# Patient Record
Sex: MALE | Race: WHITE | NOT HISPANIC OR LATINO | Employment: OTHER | URBAN - NONMETROPOLITAN AREA
[De-identification: names, ages, dates, MRNs, and addresses within clinical notes are randomized per-mention and may not be internally consistent; named-entity substitution may affect disease eponyms.]

---

## 2017-05-03 RX ORDER — CITALOPRAM 40 MG/1
TABLET ORAL
Qty: 30 TABLET | Refills: 0 | OUTPATIENT
Start: 2017-05-03

## 2017-05-15 RX ORDER — CITALOPRAM 40 MG/1
TABLET ORAL
Qty: 30 TABLET | Refills: 0 | OUTPATIENT
Start: 2017-05-15

## 2017-06-06 ENCOUNTER — OFFICE VISIT (OUTPATIENT)
Dept: FAMILY MEDICINE CLINIC | Facility: CLINIC | Age: 47
End: 2017-06-06

## 2017-06-06 ENCOUNTER — APPOINTMENT (OUTPATIENT)
Dept: LAB | Facility: HOSPITAL | Age: 47
End: 2017-06-06

## 2017-06-06 VITALS
HEIGHT: 68 IN | DIASTOLIC BLOOD PRESSURE: 68 MMHG | BODY MASS INDEX: 22.88 KG/M2 | SYSTOLIC BLOOD PRESSURE: 110 MMHG | WEIGHT: 151 LBS

## 2017-06-06 DIAGNOSIS — Z72.0 TOBACCO ABUSE: Chronic | ICD-10-CM

## 2017-06-06 DIAGNOSIS — I25.10 CAD IN NATIVE ARTERY: Primary | Chronic | ICD-10-CM

## 2017-06-06 DIAGNOSIS — G89.29 CHRONIC BILATERAL LOW BACK PAIN WITHOUT SCIATICA: ICD-10-CM

## 2017-06-06 DIAGNOSIS — I10 BENIGN HYPERTENSION: Chronic | ICD-10-CM

## 2017-06-06 DIAGNOSIS — Z23 NEED FOR VACCINATION: ICD-10-CM

## 2017-06-06 DIAGNOSIS — Z72.0 TOBACCO USE: ICD-10-CM

## 2017-06-06 DIAGNOSIS — M54.50 CHRONIC BILATERAL LOW BACK PAIN WITHOUT SCIATICA: ICD-10-CM

## 2017-06-06 LAB
ALBUMIN SERPL-MCNC: 4.2 G/DL (ref 3.4–4.8)
ALBUMIN/GLOB SERPL: 1.2 G/DL (ref 1.1–1.8)
ALP SERPL-CCNC: 73 U/L (ref 38–126)
ALT SERPL W P-5'-P-CCNC: 37 U/L (ref 21–72)
ANION GAP SERPL CALCULATED.3IONS-SCNC: 9 MMOL/L (ref 5–15)
ARTICHOKE IGE QN: 96 MG/DL (ref 1–129)
AST SERPL-CCNC: 72 U/L (ref 17–59)
BILIRUB SERPL-MCNC: 0.9 MG/DL (ref 0.2–1.3)
BUN BLD-MCNC: 15 MG/DL (ref 7–21)
BUN/CREAT SERPL: 16 (ref 7–25)
CALCIUM SPEC-SCNC: 9.4 MG/DL (ref 8.4–10.2)
CHLORIDE SERPL-SCNC: 101 MMOL/L (ref 95–110)
CO2 SERPL-SCNC: 29 MMOL/L (ref 22–31)
CREAT BLD-MCNC: 0.94 MG/DL (ref 0.7–1.3)
GFR SERPL CREATININE-BSD FRML MDRD: 85 ML/MIN/1.73 (ref 60–147)
GLOBULIN UR ELPH-MCNC: 3.6 GM/DL (ref 2.3–3.5)
GLUCOSE BLD-MCNC: 76 MG/DL (ref 60–100)
MAGNESIUM SERPL-MCNC: 1.9 MG/DL (ref 1.6–2.3)
POTASSIUM BLD-SCNC: 3.8 MMOL/L (ref 3.5–5.1)
PROT SERPL-MCNC: 7.8 G/DL (ref 6.3–8.6)
SODIUM BLD-SCNC: 139 MMOL/L (ref 137–145)

## 2017-06-06 PROCEDURE — 90471 IMMUNIZATION ADMIN: CPT | Performed by: FAMILY MEDICINE

## 2017-06-06 PROCEDURE — 83721 ASSAY OF BLOOD LIPOPROTEIN: CPT | Performed by: FAMILY MEDICINE

## 2017-06-06 PROCEDURE — 83735 ASSAY OF MAGNESIUM: CPT | Performed by: FAMILY MEDICINE

## 2017-06-06 PROCEDURE — 36415 COLL VENOUS BLD VENIPUNCTURE: CPT | Performed by: FAMILY MEDICINE

## 2017-06-06 PROCEDURE — 80053 COMPREHEN METABOLIC PANEL: CPT | Performed by: FAMILY MEDICINE

## 2017-06-06 PROCEDURE — 90715 TDAP VACCINE 7 YRS/> IM: CPT | Performed by: FAMILY MEDICINE

## 2017-06-06 PROCEDURE — 99214 OFFICE O/P EST MOD 30 MIN: CPT | Performed by: FAMILY MEDICINE

## 2017-06-06 NOTE — PROGRESS NOTES
Subjective   Thelbert Ananda Gay is a 47 y.o. male.     History of Present Illness yesterday after prolonged absence.  History of coronary disease tobacco abuse chronic back pain hypertension.  Patient has been visiting with his cardiologist.  Says he's not smoking as much.  Continues on medicines as outlined.  Does need a tetanus update.  Also time for lab work.  History noted.    The following portions of the patient's history were reviewed and updated as appropriate: allergies, current medications, past family history, past medical history, past social history, past surgical history and problem list.    Review of Systems   Constitutional: Negative for activity change, appetite change, fatigue and unexpected weight change.   HENT: Negative for trouble swallowing and voice change.    Eyes: Negative for redness and visual disturbance.   Respiratory: Negative for cough and wheezing.    Cardiovascular: Negative for chest pain and palpitations.   Gastrointestinal: Negative for abdominal pain, constipation, diarrhea, nausea and vomiting.   Genitourinary: Negative for urgency.   Musculoskeletal: Negative for joint swelling.   Neurological: Negative for syncope and headaches.   Hematological: Negative for adenopathy.   Psychiatric/Behavioral: Negative for sleep disturbance.       Objective   Physical Exam   Constitutional: He is oriented to person, place, and time. He appears well-developed.   HENT:   Head: Normocephalic.   Nose: Nose normal.   Eyes: Pupils are equal, round, and reactive to light.   Neck: Normal range of motion. No thyromegaly present.   Cardiovascular: Normal rate, regular rhythm, normal heart sounds and intact distal pulses.  Exam reveals no gallop and no friction rub.    No murmur heard.  Pulmonary/Chest: Breath sounds normal.   Abdominal: Soft. He exhibits no distension and no mass. There is no tenderness.   Musculoskeletal: Normal range of motion.   2+ peripheral pulses   Neurological: He is alert  and oriented to person, place, and time. He has normal reflexes.   Skin: Skin is warm and dry.   Psychiatric: He has a normal mood and affect. His speech is normal and behavior is normal.       Assessment/Plan   Problems Addressed this Visit        Cardiovascular and Mediastinum    Benign hypertension (Chronic)    CAD in native artery - Primary (Chronic)    Relevant Orders    Comprehensive Metabolic Panel    Magnesium    LDL Cholesterol, Direct       Other    Tobacco abuse (Chronic)    Chronic bilateral low back pain without sciatica      Other Visit Diagnoses     Need for vaccination        Relevant Orders    Tdap Vaccine Greater Than or Equal To 8yo IM       on stopping smoking.  3-10m     Counseled summer hydration  on flu vaccine in the fall counseled following up with cardiologist.  Recheck again in 6 months.

## 2017-06-28 RX ORDER — FLUTICASONE PROPIONATE 50 MCG
2 SPRAY, SUSPENSION (ML) NASAL DAILY
Qty: 1 EACH | Refills: 3 | Status: SHIPPED | OUTPATIENT
Start: 2017-06-28 | End: 2017-11-07 | Stop reason: SDUPTHER

## 2017-07-06 RX ORDER — CITALOPRAM 40 MG/1
TABLET ORAL
Qty: 30 TABLET | Refills: 5 | Status: SHIPPED | OUTPATIENT
Start: 2017-07-06 | End: 2018-03-06 | Stop reason: SDUPTHER

## 2017-11-07 RX ORDER — FLUTICASONE PROPIONATE 50 MCG
SPRAY, SUSPENSION (ML) NASAL
Qty: 16 ML | Refills: 2 | Status: SHIPPED | OUTPATIENT
Start: 2017-11-07 | End: 2018-03-06 | Stop reason: SDUPTHER

## 2018-01-03 RX ORDER — CITALOPRAM 40 MG/1
TABLET ORAL
Qty: 30 TABLET | Refills: 0 | OUTPATIENT
Start: 2018-01-03

## 2018-01-22 RX ORDER — OMEPRAZOLE 20 MG/1
CAPSULE, DELAYED RELEASE ORAL
Qty: 60 CAPSULE | Refills: 0 | OUTPATIENT
Start: 2018-01-22

## 2018-03-06 ENCOUNTER — OFFICE VISIT (OUTPATIENT)
Dept: FAMILY MEDICINE CLINIC | Facility: CLINIC | Age: 48
End: 2018-03-06

## 2018-03-06 ENCOUNTER — APPOINTMENT (OUTPATIENT)
Dept: LAB | Facility: HOSPITAL | Age: 48
End: 2018-03-06

## 2018-03-06 VITALS
WEIGHT: 147 LBS | HEIGHT: 68 IN | TEMPERATURE: 97.4 F | DIASTOLIC BLOOD PRESSURE: 74 MMHG | BODY MASS INDEX: 22.28 KG/M2 | SYSTOLIC BLOOD PRESSURE: 122 MMHG

## 2018-03-06 DIAGNOSIS — I25.10 CAD IN NATIVE ARTERY: Chronic | ICD-10-CM

## 2018-03-06 DIAGNOSIS — Z72.0 TOBACCO USE: ICD-10-CM

## 2018-03-06 DIAGNOSIS — R05.9 COUGH: Primary | ICD-10-CM

## 2018-03-06 DIAGNOSIS — J06.9 ACUTE URI: ICD-10-CM

## 2018-03-06 DIAGNOSIS — I10 BENIGN HYPERTENSION: Chronic | ICD-10-CM

## 2018-03-06 PROBLEM — G89.29 CHRONIC BILATERAL LOW BACK PAIN WITHOUT SCIATICA: Chronic | Status: ACTIVE | Noted: 2017-06-06

## 2018-03-06 PROBLEM — M54.50 CHRONIC BILATERAL LOW BACK PAIN WITHOUT SCIATICA: Chronic | Status: ACTIVE | Noted: 2017-06-06

## 2018-03-06 LAB
ALBUMIN SERPL-MCNC: 4.1 G/DL (ref 3.4–4.8)
ALBUMIN/GLOB SERPL: 1.2 G/DL (ref 1.1–1.8)
ALP SERPL-CCNC: 78 U/L (ref 38–126)
ALT SERPL W P-5'-P-CCNC: 35 U/L (ref 21–72)
ANION GAP SERPL CALCULATED.3IONS-SCNC: 14 MMOL/L (ref 5–15)
ARTICHOKE IGE QN: 101 MG/DL (ref 1–129)
AST SERPL-CCNC: 43 U/L (ref 17–59)
BILIRUB SERPL-MCNC: 0.4 MG/DL (ref 0.2–1.3)
BUN BLD-MCNC: 14 MG/DL (ref 7–21)
BUN/CREAT SERPL: 17.9 (ref 7–25)
CALCIUM SPEC-SCNC: 9.4 MG/DL (ref 8.4–10.2)
CHLORIDE SERPL-SCNC: 102 MMOL/L (ref 95–110)
CO2 SERPL-SCNC: 25 MMOL/L (ref 22–31)
CREAT BLD-MCNC: 0.78 MG/DL (ref 0.7–1.3)
DEPRECATED RDW RBC AUTO: 42 FL (ref 35.1–43.9)
ERYTHROCYTE [DISTWIDTH] IN BLOOD BY AUTOMATED COUNT: 12.5 % (ref 11.5–14.5)
GFR SERPL CREATININE-BSD FRML MDRD: 106 ML/MIN/1.73 (ref 63–147)
GLOBULIN UR ELPH-MCNC: 3.3 GM/DL (ref 2.3–3.5)
GLUCOSE BLD-MCNC: 86 MG/DL (ref 60–100)
HCT VFR BLD AUTO: 40.2 % (ref 39–49)
HGB BLD-MCNC: 13.9 G/DL (ref 13.7–17.3)
MAGNESIUM SERPL-MCNC: 1.8 MG/DL (ref 1.6–2.3)
MCH RBC QN AUTO: 31.7 PG (ref 26.5–34)
MCHC RBC AUTO-ENTMCNC: 34.6 G/DL (ref 31.5–36.3)
MCV RBC AUTO: 91.6 FL (ref 80–98)
PLATELET # BLD AUTO: 188 10*3/MM3 (ref 150–450)
PMV BLD AUTO: 10.6 FL (ref 8–12)
POTASSIUM BLD-SCNC: 3.8 MMOL/L (ref 3.5–5.1)
PROT SERPL-MCNC: 7.4 G/DL (ref 6.3–8.6)
RBC # BLD AUTO: 4.39 10*6/MM3 (ref 4.37–5.74)
SODIUM BLD-SCNC: 141 MMOL/L (ref 137–145)
WBC NRBC COR # BLD: 9.34 10*3/MM3 (ref 3.2–9.8)

## 2018-03-06 PROCEDURE — 99214 OFFICE O/P EST MOD 30 MIN: CPT | Performed by: FAMILY MEDICINE

## 2018-03-06 PROCEDURE — 83735 ASSAY OF MAGNESIUM: CPT | Performed by: FAMILY MEDICINE

## 2018-03-06 PROCEDURE — 85027 COMPLETE CBC AUTOMATED: CPT | Performed by: FAMILY MEDICINE

## 2018-03-06 PROCEDURE — 36415 COLL VENOUS BLD VENIPUNCTURE: CPT | Performed by: FAMILY MEDICINE

## 2018-03-06 PROCEDURE — 80053 COMPREHEN METABOLIC PANEL: CPT | Performed by: FAMILY MEDICINE

## 2018-03-06 PROCEDURE — 83721 ASSAY OF BLOOD LIPOPROTEIN: CPT | Performed by: FAMILY MEDICINE

## 2018-03-06 RX ORDER — FLUTICASONE PROPIONATE 50 MCG
2 SPRAY, SUSPENSION (ML) NASAL DAILY
Qty: 16 ML | Refills: 2 | Status: SHIPPED | OUTPATIENT
Start: 2018-03-06 | End: 2018-08-13 | Stop reason: SDUPTHER

## 2018-03-06 RX ORDER — DOXYCYCLINE HYCLATE 100 MG/1
CAPSULE ORAL
Qty: 20 CAPSULE | Refills: 0 | Status: SHIPPED | OUTPATIENT
Start: 2018-03-06 | End: 2018-09-09

## 2018-03-06 RX ORDER — ALBUTEROL SULFATE 90 UG/1
2 AEROSOL, METERED RESPIRATORY (INHALATION) EVERY 4 HOURS PRN
Qty: 18 G | Refills: 11 | Status: SHIPPED | OUTPATIENT
Start: 2018-03-06 | End: 2019-05-17 | Stop reason: SDUPTHER

## 2018-03-06 RX ORDER — OMEPRAZOLE 20 MG/1
20 CAPSULE, DELAYED RELEASE ORAL DAILY
Qty: 60 CAPSULE | Refills: 5 | Status: SHIPPED | OUTPATIENT
Start: 2018-03-06 | End: 2019-05-17 | Stop reason: SDUPTHER

## 2018-03-06 RX ORDER — CITALOPRAM 40 MG/1
40 TABLET ORAL DAILY
Qty: 30 TABLET | Refills: 5 | Status: SHIPPED | OUTPATIENT
Start: 2018-03-06 | End: 2018-11-29 | Stop reason: SDUPTHER

## 2018-03-06 NOTE — PATIENT INSTRUCTIONS
Steps to Quit Smoking  Smoking tobacco can be harmful to your health and can affect almost every organ in your body. Smoking puts you, and those around you, at risk for developing many serious chronic diseases. Quitting smoking is difficult, but it is one of the best things that you can do for your health. It is never too late to quit.  What are the benefits of quitting smoking?  When you quit smoking, you lower your risk of developing serious diseases and conditions, such as:  · Lung cancer or lung disease, such as COPD.  · Heart disease.  · Stroke.  · Heart attack.  · Infertility.  · Osteoporosis and bone fractures.  Additionally, symptoms such as coughing, wheezing, and shortness of breath may get better when you quit. You may also find that you get sick less often because your body is stronger at fighting off colds and infections. If you are pregnant, quitting smoking can help to reduce your chances of having a baby of low birth weight.  How do I get ready to quit?  When you decide to quit smoking, create a plan to make sure that you are successful. Before you quit:  · Pick a date to quit. Set a date within the next two weeks to give you time to prepare.  · Write down the reasons why you are quitting. Keep this list in places where you will see it often, such as on your bathroom mirror or in your car or wallet.  · Identify the people, places, things, and activities that make you want to smoke (triggers) and avoid them. Make sure to take these actions:  ¨ Throw away all cigarettes at home, at work, and in your car.  ¨ Throw away smoking accessories, such as ashtrays and lighters.  ¨ Clean your car and make sure to empty the ashtray.  ¨ Clean your home, including curtains and carpets.  · Tell your family, friends, and coworkers that you are quitting. Support from your loved ones can make quitting easier.  · Talk with your health care provider about your options for quitting smoking.  · Find out what treatment  options are covered by your health insurance.  What strategies can I use to quit smoking?  Talk with your healthcare provider about different strategies to quit smoking. Some strategies include:  · Quitting smoking altogether instead of gradually lessening how much you smoke over a period of time. Research shows that quitting “cold turkey” is more successful than gradually quitting.  · Attending in-person counseling to help you build problem-solving skills. You are more likely to have success in quitting if you attend several counseling sessions. Even short sessions of 10 minutes can be effective.  · Finding resources and support systems that can help you to quit smoking and remain smoke-free after you quit. These resources are most helpful when you use them often. They can include:  ¨ Online chats with a counselor.  ¨ Telephone quitlines.  ¨ Printed self-help materials.  ¨ Support groups or group counseling.  ¨ Text messaging programs.  ¨ Mobile phone applications.  · Taking medicines to help you quit smoking. (If you are pregnant or breastfeeding, talk with your health care provider first.) Some medicines contain nicotine and some do not. Both types of medicines help with cravings, but the medicines that include nicotine help to relieve withdrawal symptoms. Your health care provider may recommend:  ¨ Nicotine patches, gum, or lozenges.  ¨ Nicotine inhalers or sprays.  ¨ Non-nicotine medicine that is taken by mouth.  Talk with your health care provider about combining strategies, such as taking medicines while you are also receiving in-person counseling. Using these two strategies together makes you more likely to succeed in quitting than if you used either strategy on its own.  If you are pregnant or breastfeeding, talk with your health care provider about finding counseling or other support strategies to quit smoking. Do not take medicine to help you quit smoking unless told to do so by your health care  provider.  What things can I do to make it easier to quit?  Quitting smoking might feel overwhelming at first, but there is a lot that you can do to make it easier. Take these important actions:  · Reach out to your family and friends and ask that they support and encourage you during this time. Call telephone quitlines, reach out to support groups, or work with a counselor for support.  · Ask people who smoke to avoid smoking around you.  · Avoid places that trigger you to smoke, such as bars, parties, or smoke-break areas at work.  · Spend time around people who do not smoke.  · Lessen stress in your life, because stress can be a smoking trigger for some people. To lessen stress, try:  ¨ Exercising regularly.  ¨ Deep-breathing exercises.  ¨ Yoga.  ¨ Meditating.  ¨ Performing a body scan. This involves closing your eyes, scanning your body from head to toe, and noticing which parts of your body are particularly tense. Purposefully relax the muscles in those areas.  · Download or purchase mobile phone or tablet apps (applications) that can help you stick to your quit plan by providing reminders, tips, and encouragement. There are many free apps, such as QuitGuide from the CDC (Centers for Disease Control and Prevention). You can find other support for quitting smoking (smoking cessation) through smokefree.gov and other websites.  How will I feel when I quit smoking?  Within the first 24 hours of quitting smoking, you may start to feel some withdrawal symptoms. These symptoms are usually most noticeable 2-3 days after quitting, but they usually do not last beyond 2-3 weeks. Changes or symptoms that you might experience include:  · Mood swings.  · Restlessness, anxiety, or irritation.  · Difficulty concentrating.  · Dizziness.  · Strong cravings for sugary foods in addition to nicotine.  · Mild weight gain.  · Constipation.  · Nausea.  · Coughing or a sore throat.  · Changes in how your medicines work in your  body.  · A depressed mood.  · Difficulty sleeping (insomnia).  After the first 2-3 weeks of quitting, you may start to notice more positive results, such as:  · Improved sense of smell and taste.  · Decreased coughing and sore throat.  · Slower heart rate.  · Lower blood pressure.  · Clearer skin.  · The ability to breathe more easily.  · Fewer sick days.  Quitting smoking is very challenging for most people. Do not get discouraged if you are not successful the first time. Some people need to make many attempts to quit before they achieve long-term success. Do your best to stick to your quit plan, and talk with your health care provider if you have any questions or concerns.  This information is not intended to replace advice given to you by your health care provider. Make sure you discuss any questions you have with your health care provider.  Document Released: 12/12/2002 Document Revised: 08/15/2017 Document Reviewed: 05/03/2016  [employer] Interactive Patient Education © 2017 [employer]

## 2018-03-06 NOTE — PROGRESS NOTES
Subjective   Thelbert Ananda Gay is a 47 y.o. male.     History of Present Illness   just a prolonged absence history of coronary disease tobacco abuse.  In interim states that lasted 3 days chills cough congestion coryza..  Vague history.  Some mucus production.  Also states out of certain medications.   need for follow-up.  Continues to smoke.    The following portions of the patient's history were reviewed and updated as appropriate: allergies, current medications, past family history, past medical history, past social history, past surgical history and problem list.    Review of Systems   Constitutional: Positive for fatigue. Negative for activity change, appetite change and unexpected weight change.   HENT: Positive for congestion. Negative for trouble swallowing and voice change.    Eyes: Negative for redness and visual disturbance.   Respiratory: Positive for cough. Negative for wheezing.    Cardiovascular: Negative for chest pain and palpitations.   Gastrointestinal: Negative for abdominal pain, constipation, diarrhea, nausea and vomiting.   Genitourinary: Negative for urgency.   Musculoskeletal: Negative for joint swelling.   Neurological: Negative for syncope and headaches.   Hematological: Negative for adenopathy.   Psychiatric/Behavioral: Negative for sleep disturbance.       Objective   Physical Exam   Constitutional: He is oriented to person, place, and time. He appears well-developed.   HENT:   Head: Normocephalic.   Right Ear: External ear normal.   Nose: Nose normal.   Mouth/Throat: Oropharynx is clear and moist.   Eyes: Pupils are equal, round, and reactive to light.   Neck: Normal range of motion. No thyromegaly present.   Cardiovascular: Normal rate, regular rhythm, normal heart sounds and intact distal pulses.  Exam reveals no gallop and no friction rub.    No murmur heard.  Pulmonary/Chest: He has rhonchi in the right lower field and the left lower field.   Normal rate   Abdominal:  Soft. He exhibits no distension and no mass. There is no tenderness.   Musculoskeletal: Normal range of motion.   Neurological: He is alert and oriented to person, place, and time. He has normal reflexes.   Skin: Skin is warm and dry.   Psychiatric: His affect is blunt. His speech is delayed. He is slowed.       Assessment/Plan   Problems Addressed this Visit        Cardiovascular and Mediastinum    Benign hypertension (Chronic)    CAD in native artery (Chronic)    Relevant Orders    Comprehensive Metabolic Panel    Magnesium    LDL Cholesterol, Direct      Other Visit Diagnoses     Cough    -  Primary    Relevant Medications    albuterol (PROVENTIL HFA;VENTOLIN HFA) 108 (90 Base) MCG/ACT inhaler    doxycycline (VIBRAMYCIN) 100 MG capsule    Other Relevant Orders    CBC (No Diff)    XR Chest 2 View    Tobacco use        Relevant Medications    albuterol (PROVENTIL HFA;VENTOLIN HFA) 108 (90 Base) MCG/ACT inhaler    doxycycline (VIBRAMYCIN) 100 MG capsule    Other Relevant Orders    XR Chest 2 View    Acute URI        Relevant Medications    albuterol (PROVENTIL HFA;VENTOLIN HFA) 108 (90 Base) MCG/ACT inhaler    doxycycline (VIBRAMYCIN) 100 MG capsule    Other Relevant Orders    CBC (No Diff)    XR Chest 2 View         on need for hydration medication chest x-ray given history start presumptive therapy.  Baseline lab.  Recheck a week.   on stopping smoking.  3-10m

## 2018-06-12 ENCOUNTER — TRANSCRIBE ORDERS (OUTPATIENT)
Dept: PODIATRY | Facility: CLINIC | Age: 48
End: 2018-06-12

## 2018-06-12 DIAGNOSIS — M79.673 PAIN OF FOOT, UNSPECIFIED LATERALITY: Primary | ICD-10-CM

## 2018-07-30 ENCOUNTER — HOSPITAL ENCOUNTER (EMERGENCY)
Facility: HOSPITAL | Age: 48
Discharge: HOME | End: 2018-07-30

## 2018-07-30 VITALS
TEMPERATURE: 97.7 F | SYSTOLIC BLOOD PRESSURE: 154 MMHG | HEART RATE: 75 BPM | BODY MASS INDEX: 25.01 KG/M2 | HEIGHT: 68 IN | OXYGEN SATURATION: 98 % | WEIGHT: 165 LBS | RESPIRATION RATE: 20 BRPM | DIASTOLIC BLOOD PRESSURE: 102 MMHG

## 2018-08-13 RX ORDER — FLUTICASONE PROPIONATE 50 MCG
SPRAY, SUSPENSION (ML) NASAL
Qty: 48 ML | Refills: 1 | Status: SHIPPED | OUTPATIENT
Start: 2018-08-13 | End: 2018-10-04 | Stop reason: SDUPTHER

## 2018-09-09 ENCOUNTER — HOSPITAL ENCOUNTER (EMERGENCY)
Facility: HOSPITAL | Age: 48
Discharge: HOME | End: 2018-09-09
Admitting: FAMILY MEDICINE

## 2018-09-09 ENCOUNTER — APPOINTMENT (OUTPATIENT)
Dept: CT IMAGING | Facility: HOSPITAL | Age: 48
End: 2018-09-09

## 2018-09-09 VITALS
WEIGHT: 155 LBS | OXYGEN SATURATION: 100 % | TEMPERATURE: 97.7 F | RESPIRATION RATE: 18 BRPM | DIASTOLIC BLOOD PRESSURE: 64 MMHG | SYSTOLIC BLOOD PRESSURE: 119 MMHG | BODY MASS INDEX: 23.49 KG/M2 | HEIGHT: 68 IN | HEART RATE: 60 BPM

## 2018-09-09 DIAGNOSIS — G43.009 MIGRAINE WITHOUT AURA AND WITHOUT STATUS MIGRAINOSUS, NOT INTRACTABLE: Primary | ICD-10-CM

## 2018-09-09 PROCEDURE — 25010000002 PROMETHAZINE PER 50 MG: Performed by: NURSE PRACTITIONER

## 2018-09-09 PROCEDURE — 25010000002 MORPHINE PER 10 MG: Performed by: NURSE PRACTITIONER

## 2018-09-09 PROCEDURE — 96375 TX/PRO/DX INJ NEW DRUG ADDON: CPT

## 2018-09-09 PROCEDURE — 99283 EMERGENCY DEPT VISIT LOW MDM: CPT

## 2018-09-09 PROCEDURE — 70450 CT HEAD/BRAIN W/O DYE: CPT

## 2018-09-09 PROCEDURE — 25010000002 METHYLPREDNISOLONE PER 125 MG: Performed by: NURSE PRACTITIONER

## 2018-09-09 PROCEDURE — 25010000002 KETOROLAC TROMETHAMINE PER 15 MG: Performed by: NURSE PRACTITIONER

## 2018-09-09 PROCEDURE — 96374 THER/PROPH/DIAG INJ IV PUSH: CPT

## 2018-09-09 PROCEDURE — 25010000002 DIPHENHYDRAMINE PER 50 MG: Performed by: NURSE PRACTITIONER

## 2018-09-09 RX ORDER — BUTALBITAL, ACETAMINOPHEN AND CAFFEINE 50; 325; 40 MG/1; MG/1; MG/1
TABLET ORAL
Qty: 24 TABLET | Refills: 0 | Status: SHIPPED | OUTPATIENT
Start: 2018-09-09 | End: 2018-11-01

## 2018-09-09 RX ORDER — IBUPROFEN 800 MG/1
800 TABLET ORAL
Qty: 15 TABLET | Refills: 0 | Status: SHIPPED | OUTPATIENT
Start: 2018-09-09 | End: 2018-09-14

## 2018-09-09 RX ORDER — KETOROLAC TROMETHAMINE 30 MG/ML
30 INJECTION, SOLUTION INTRAMUSCULAR; INTRAVENOUS ONCE
Status: COMPLETED | OUTPATIENT
Start: 2018-09-09 | End: 2018-09-09

## 2018-09-09 RX ORDER — PROMETHAZINE HYDROCHLORIDE 25 MG/ML
12.5 INJECTION, SOLUTION INTRAMUSCULAR; INTRAVENOUS ONCE
Status: COMPLETED | OUTPATIENT
Start: 2018-09-09 | End: 2018-09-09

## 2018-09-09 RX ORDER — DIPHENHYDRAMINE HYDROCHLORIDE 50 MG/ML
50 INJECTION INTRAMUSCULAR; INTRAVENOUS ONCE
Status: COMPLETED | OUTPATIENT
Start: 2018-09-09 | End: 2018-09-09

## 2018-09-09 RX ORDER — BUTORPHANOL TARTRATE 1 MG/ML
1 INJECTION, SOLUTION INTRAMUSCULAR; INTRAVENOUS ONCE
Status: DISCONTINUED | OUTPATIENT
Start: 2018-09-09 | End: 2018-09-09

## 2018-09-09 RX ORDER — METHYLPREDNISOLONE SODIUM SUCCINATE 125 MG/2ML
125 INJECTION, POWDER, LYOPHILIZED, FOR SOLUTION INTRAMUSCULAR; INTRAVENOUS ONCE
Status: COMPLETED | OUTPATIENT
Start: 2018-09-09 | End: 2018-09-09

## 2018-09-09 RX ORDER — SODIUM CHLORIDE 0.9 % (FLUSH) 0.9 %
10 SYRINGE (ML) INJECTION AS NEEDED
Status: DISCONTINUED | OUTPATIENT
Start: 2018-09-09 | End: 2018-09-09 | Stop reason: HOSPADM

## 2018-09-09 RX ADMIN — METHYLPREDNISOLONE SODIUM SUCCINATE 125 MG: 125 INJECTION, POWDER, FOR SOLUTION INTRAMUSCULAR; INTRAVENOUS at 12:51

## 2018-09-09 RX ADMIN — PROMETHAZINE HYDROCHLORIDE 12.5 MG: 25 INJECTION INTRAMUSCULAR; INTRAVENOUS at 11:33

## 2018-09-09 RX ADMIN — DIPHENHYDRAMINE HYDROCHLORIDE 50 MG: 50 INJECTION INTRAMUSCULAR; INTRAVENOUS at 11:34

## 2018-09-09 RX ADMIN — MORPHINE SULFATE 4 MG: 4 INJECTION INTRAVENOUS at 12:51

## 2018-09-09 RX ADMIN — KETOROLAC TROMETHAMINE 30 MG: 30 INJECTION, SOLUTION INTRAMUSCULAR; INTRAVENOUS at 11:34

## 2018-09-09 RX ADMIN — SODIUM CHLORIDE 1000 ML: 900 INJECTION, SOLUTION INTRAVENOUS at 11:39

## 2018-09-09 NOTE — ED PROVIDER NOTES
Subjective   48-year-old male who emergency department today complaining of migraine type headache.  Patient reports she has a history of migraines in the past.  This headache/migraine started yesterday with a gradual onset.  He reports the pain in the frontal lobe area.  Denies thunderclap or worse headache of life.  Denies photophobia, trouble with vision, nausea, vomiting.        History provided by:  Patient   used: No        Review of Systems   Constitutional: Negative for activity change, chills, fatigue and fever.   HENT: Negative for congestion, ear pain, hearing loss, mouth sores, nosebleeds, postnasal drip, sinus pain, sinus pressure and voice change.    Eyes: Negative for photophobia and visual disturbance.   Respiratory: Negative for apnea, cough and wheezing.    Cardiovascular: Negative for chest pain and palpitations.   Gastrointestinal: Negative for abdominal distention, abdominal pain, constipation, nausea and vomiting.   Endocrine: Negative for cold intolerance.   Genitourinary: Negative for flank pain.   Musculoskeletal: Negative for back pain.   Skin: Negative for rash.   Allergic/Immunologic: Negative for immunocompromised state.   Neurological: Positive for headaches. Negative for dizziness, weakness and numbness.   Hematological: Negative for adenopathy.   Psychiatric/Behavioral: Negative for confusion.   All other systems reviewed and are negative.      Past Medical History:   Diagnosis Date   • Acute bronchitis    • Astigmatism    • Atherosclerotic heart disease of native coronary artery without angina pectoris    • Backache    • Benign hypertension    • Common cold    • Cough    • Dyspnea    • Folliculitis    • History of echocardiogram 03/20/2012    Echocardiogram W/O color flow 71803 (1) - LV dilstation with mild LV dysfunction with EF 45%. Septal hypokinesis. Diastolic relaxation abnormality of left ventricle. Mild mitral and tricuspid regurgitation w/mild pulmonary  insufficiency.   • Myopia    • Tobacco dependence syndrome        Allergies   Allergen Reactions   • Penicillins Swelling     Facial swelling       Past Surgical History:   Procedure Laterality Date   • CARDIAC SURGERY     • CORONARY ANGIOPLASTY WITH STENT PLACEMENT         Family History   Problem Relation Age of Onset   • Heart disease Mother    • Hypertension Mother    • Arthritis Mother    • Heart disease Father    • Hypertension Father    • Arthritis Father    • Hypertension Sister    • Heart disease Sister    • Heart disease Brother    • Hypertension Brother        Social History     Social History   • Marital status:      Social History Main Topics   • Smoking status: Current Every Day Smoker     Packs/day: 1.00   • Smokeless tobacco: Never Used   • Alcohol use No   • Drug use: No     Other Topics Concern   • Not on file           Objective   Physical Exam   Constitutional: He is oriented to person, place, and time. Vital signs are normal. He appears well-developed and well-nourished.   HENT:   Head: Normocephalic.   Nose: Nose normal.   Eyes: Pupils are equal, round, and reactive to light. Conjunctivae are normal.   Neck: Normal range of motion.   Cardiovascular: Normal rate, regular rhythm and normal heart sounds.    Pulmonary/Chest: Effort normal and breath sounds normal.   Abdominal: Soft.   Musculoskeletal: Normal range of motion.   Neurological: He is alert and oriented to person, place, and time. No cranial nerve deficit or sensory deficit. GCS eye subscore is 4. GCS verbal subscore is 5. GCS motor subscore is 6.   Skin: Skin is warm and dry.   Psychiatric: He has a normal mood and affect. His speech is normal and behavior is normal. Judgment and thought content normal. Cognition and memory are normal.   Nursing note and vitals reviewed.      Procedures           ED Course  ED Course as of Sep 09 1244   Sun Sep 09, 2018   [address] [id number]   [[place]]      ED Course User Index  [JL] Dirk  BISMARK Larson        ..  CT Head Without Contrast   Final Result   1.  No acute intracranial abnormality.   2.  Pansinusitis      Electronically signed by:  Bishop Izaguirre MD  9/9/2018 12:24 PM CDT   Workstation: [id number]                  [facility]      Final diagnoses:   Migraine without aura and without status migrainosus, not intractable            Rogelio Cavazos APRN  09/09/18 1244

## 2018-09-09 NOTE — ED NOTES
Provider notified of patient's pain level. No new orders at this time.       Courtney Camacho RN  09/09/18 0668

## 2018-09-10 RX ORDER — FLUTICASONE PROPIONATE 50 MCG
SPRAY, SUSPENSION (ML) NASAL
Qty: 16 ML | Refills: 0 | Status: SHIPPED | OUTPATIENT
Start: 2018-09-10 | End: 2018-10-04 | Stop reason: SDUPTHER

## 2018-09-14 ENCOUNTER — OFFICE VISIT (OUTPATIENT)
Dept: FAMILY MEDICINE CLINIC | Facility: CLINIC | Age: 48
End: 2018-09-14

## 2018-09-14 VITALS
WEIGHT: 155 LBS | SYSTOLIC BLOOD PRESSURE: 140 MMHG | BODY MASS INDEX: 23.49 KG/M2 | DIASTOLIC BLOOD PRESSURE: 90 MMHG | HEIGHT: 68 IN

## 2018-09-14 DIAGNOSIS — J34.2 DEVIATED NASAL SEPTUM: ICD-10-CM

## 2018-09-14 DIAGNOSIS — J01.00 ACUTE MAXILLARY SINUSITIS, RECURRENCE NOT SPECIFIED: ICD-10-CM

## 2018-09-14 DIAGNOSIS — R51.9 ACUTE NONINTRACTABLE HEADACHE, UNSPECIFIED HEADACHE TYPE: Primary | ICD-10-CM

## 2018-09-14 DIAGNOSIS — Z72.0 TOBACCO ABUSE: Chronic | ICD-10-CM

## 2018-09-14 PROCEDURE — 96372 THER/PROPH/DIAG INJ SC/IM: CPT | Performed by: FAMILY MEDICINE

## 2018-09-14 PROCEDURE — 99214 OFFICE O/P EST MOD 30 MIN: CPT | Performed by: FAMILY MEDICINE

## 2018-09-14 RX ORDER — SOD CHLOR,SOD BICARB/NETI POT
PACKET, WITH RINSE DEVICE NASAL
Qty: 30 EACH | Refills: 2 | Status: SHIPPED | OUTPATIENT
Start: 2018-09-14 | End: 2018-11-01

## 2018-09-14 RX ORDER — CYCLOBENZAPRINE HCL 5 MG
5 TABLET ORAL DAILY
COMMUNITY
Start: 2018-09-07 | End: 2019-08-15

## 2018-09-14 RX ORDER — CARVEDILOL 3.12 MG/1
3.12 TABLET ORAL DAILY
COMMUNITY
Start: 2018-08-13 | End: 2019-05-17 | Stop reason: SDUPTHER

## 2018-09-14 RX ORDER — PREDNISONE 20 MG/1
TABLET ORAL
Qty: 15 TABLET | Refills: 0 | Status: SHIPPED | OUTPATIENT
Start: 2018-09-14 | End: 2018-11-01

## 2018-09-14 RX ORDER — TRIAMCINOLONE ACETONIDE 40 MG/ML
40 INJECTION, SUSPENSION INTRA-ARTICULAR; INTRAMUSCULAR ONCE
Status: COMPLETED | OUTPATIENT
Start: 2018-09-14 | End: 2018-09-14

## 2018-09-14 RX ORDER — DOXYCYCLINE HYCLATE 100 MG/1
CAPSULE ORAL
Qty: 20 CAPSULE | Refills: 0 | Status: SHIPPED | OUTPATIENT
Start: 2018-09-14 | End: 2018-11-01

## 2018-09-14 RX ADMIN — TRIAMCINOLONE ACETONIDE 40 MG: 40 INJECTION, SUSPENSION INTRA-ARTICULAR; INTRAMUSCULAR at 14:01

## 2018-09-14 NOTE — PROGRESS NOTES
Subjective   Thelocrinne Gay is a 48 y.o. male.     History of Present Illness     requesting evaluation of persistent headache states had headache over 10 days.  Been urgent care and ER.  Given medicines for inflammatory headache.  Studies have revealed what looks like pansinusitis both maxillary sides.  No sphenoid involvement of the can tell or mastoid.  Also has a marked deviated septum to the right which is causing problems with the drainage.  History noted.  Continues to smoke.    The following portions of the patient's history were reviewed and updated as appropriate: allergies, current medications, past family history, past medical history, past social history, past surgical history and problem list.    Review of Systems   Constitutional: Negative for activity change, appetite change, fatigue and unexpected weight change.   HENT: Negative for trouble swallowing and voice change.    Eyes: Negative for redness and visual disturbance.   Respiratory: Negative for cough and wheezing.    Cardiovascular: Negative for chest pain and palpitations.   Gastrointestinal: Negative for abdominal pain, constipation, diarrhea, nausea and vomiting.   Genitourinary: Negative for urgency.   Musculoskeletal: Negative for joint swelling.   Neurological: Positive for headaches. Negative for syncope.   Hematological: Negative for adenopathy.   Psychiatric/Behavioral: Negative for sleep disturbance.       Objective   Physical Exam   Constitutional: He is oriented to person, place, and time. He appears well-developed.   HENT:   Head: Normocephalic.   Right Ear: External ear normal.   Left Ear: External ear normal.   Nose: Nasal deformity present.   Mouth/Throat: Oropharynx is clear and moist.   Marked distal septal deformity to the right ear obstructing the right nasal passage.  Mild postnasal drip sinuses are tender   Eyes: Pupils are equal, round, and reactive to light.   Neck: Normal range of motion. No thyromegaly present.    Cardiovascular: Normal rate, regular rhythm, normal heart sounds and intact distal pulses.  Exam reveals no gallop and no friction rub.    No murmur heard.  Pulmonary/Chest: Breath sounds normal.   Abdominal: Soft. He exhibits no distension and no mass. There is no tenderness.   Musculoskeletal: Normal range of motion.   Neurological: He is alert and oriented to person, place, and time. He has normal reflexes.   Skin: Skin is warm and dry.   Psychiatric: His mood appears anxious.       Assessment/Plan   Julia was seen today for headache.    Diagnoses and all orders for this visit:    Acute nonintractable headache, unspecified headache type  -     Hypertonic Nasal Wash (NASAFLO NETI POT NASAL WASH) pack; Use bid prn    Acute maxillary sinusitis, recurrence not specified  -     triamcinolone acetonide (KENALOG-40) injection 40 mg; Inject 1 mL into the appropriate muscle as directed by prescriber 1 (One) Time.  -     predniSONE (DELTASONE) 20 MG tablet; 2qdx 5 then 1qd x 5 stop  -     Hypertonic Nasal Wash (NASAFLO NETI POT NASAL WASH) pack; Use bid prn  -     doxycycline (VIBRAMYCIN) 100 MG capsule; 1 bid x 10days with food    Deviated nasal septum  -     Ambulatory Referral to ENT (Otolaryngology)  -     Hypertonic Nasal Wash (NASAFLO NETI POT NASAL WASH) pack; Use bid prn    Tobacco abuse      Acute treatment with Manda pot's Flonase short-term medication by injection and mouth counseled on intermittent use of anti-inflammatories.  Counseled on disease process.  Consultation with ENT regards to an anatomic problem.  Rechecks directed

## 2018-10-04 RX ORDER — FLUTICASONE PROPIONATE 50 MCG
SPRAY, SUSPENSION (ML) NASAL
Qty: 16 ML | Refills: 0 | Status: SHIPPED | OUTPATIENT
Start: 2018-10-04 | End: 2018-10-31 | Stop reason: SDUPTHER

## 2018-10-17 DIAGNOSIS — I25.710 ATHEROSCLEROSIS OF AUTOLOGOUS VEIN CORONARY ARTERY BYPASS GRAFT WITH UNSTABLE ANGINA PECTORIS (HCC): ICD-10-CM

## 2018-10-17 DIAGNOSIS — I25.110 ATHEROSCLEROSIS OF NATIVE CORONARY ARTERY OF NATIVE HEART WITH UNSTABLE ANGINA PECTORIS (HCC): ICD-10-CM

## 2018-10-17 DIAGNOSIS — R07.9 CHEST PAIN, UNSPECIFIED TYPE: Primary | ICD-10-CM

## 2018-10-31 RX ORDER — FLUTICASONE PROPIONATE 50 MCG
SPRAY, SUSPENSION (ML) NASAL
Qty: 16 ML | Refills: 0 | Status: SHIPPED | OUTPATIENT
Start: 2018-10-31 | End: 2018-11-21

## 2018-11-01 ENCOUNTER — OFFICE VISIT (OUTPATIENT)
Dept: OTOLARYNGOLOGY | Facility: CLINIC | Age: 48
End: 2018-11-01

## 2018-11-01 VITALS — HEIGHT: 68 IN | WEIGHT: 155 LBS | BODY MASS INDEX: 23.49 KG/M2 | TEMPERATURE: 96.9 F

## 2018-11-01 DIAGNOSIS — J34.3 NASAL TURBINATE HYPERTROPHY: ICD-10-CM

## 2018-11-01 DIAGNOSIS — J32.4 CHRONIC PANSINUSITIS: Primary | ICD-10-CM

## 2018-11-01 DIAGNOSIS — J34.2 NASAL SEPTAL DEVIATION: ICD-10-CM

## 2018-11-01 PROCEDURE — 99203 OFFICE O/P NEW LOW 30 MIN: CPT | Performed by: OTOLARYNGOLOGY

## 2018-11-01 RX ORDER — AZELASTINE 1 MG/ML
2 SPRAY, METERED NASAL 2 TIMES DAILY
Qty: 30 ML | Refills: 11 | Status: SHIPPED | OUTPATIENT
Start: 2018-11-01 | End: 2018-11-27 | Stop reason: HOSPADM

## 2018-11-01 RX ORDER — CEFDINIR 300 MG/1
300 CAPSULE ORAL 2 TIMES DAILY
Qty: 42 CAPSULE | Refills: 0 | Status: SHIPPED | OUTPATIENT
Start: 2018-11-01 | End: 2018-11-20

## 2018-11-01 NOTE — PROGRESS NOTES
Subjective   Julia Gay is a 48 y.o. male.   Patient's referred sinusitis    History of Present Illness    patient has long-standing nasal traction been on multiple medications including nasal sprays has postnasal drip congestion decreased sense of smell and facial pain or pressure his not had a full CT but had previous plain film x-rays of the sinuses which we don't have the full report  Had previous trauma to his nose is frustrated by his persistent symptoms which haven't responded appropriate medical therapy was closed Flonase he also has a chronic pain syndrome he also has a history of COPD and treated for reflux and not treated with nasal  With Azelastine his not been on ipratropium  She's been on other antihistamines as well    The following portions of the patient's history were reviewed and updated as appropriate: allergies, current medications, past family history, past medical history, past social history, past surgical history and problem list.      Julia Gay reports that he has been smoking.  He has been smoking about 1.00 pack per day. He has never used smokeless tobacco. He reports that he does not drink alcohol or use drugs.  Patient is a tobacco user and has been counseled for use of tobacco products    Family History   Problem Relation Age of Onset   • Heart disease Mother    • Hypertension Mother    • Arthritis Mother    • Heart disease Father    • Hypertension Father    • Arthritis Father    • Hypertension Sister    • Heart disease Sister    • Heart disease Brother    • Hypertension Brother          Current Outpatient Prescriptions:   •  albuterol (PROVENTIL HFA;VENTOLIN HFA) 108 (90 Base) MCG/ACT inhaler, Inhale 2 puffs Every 4 (Four) Hours As Needed for Wheezing., Disp: 18 g, Rfl: 11  •  ASPIRIN PO, Take 1 tablet by mouth daily. Indication: heart, Disp: , Rfl:   •  atorvastatin (LIPITOR) 20 MG tablet, Take 20 mg by mouth every night. at bedtime; Indication: cholesterol,  Disp: , Rfl:   •  carvedilol (COREG) 3.125 MG tablet, Take 3.125 mg by mouth Daily., Disp: , Rfl:   •  citalopram (CeleXA) 40 MG tablet, Take 1 tablet by mouth Daily., Disp: 30 tablet, Rfl: 5  •  cyclobenzaprine (FLEXERIL) 5 MG tablet, Take 5 mg by mouth Daily., Disp: , Rfl:   •  fluticasone (FLONASE) 50 MCG/ACT nasal spray, SHAKE LIQUID AND USE 2 SPRAYS IN EACH NOSTRIL DAILY, Disp: 16 mL, Rfl: 0  •  gabapentin (NEURONTIN) 300 MG capsule, Take 300 mg by mouth daily., Disp: , Rfl:   •  Hydrocodone-Acetaminophen (LORTAB 10)  MG per tablet, Take 1 tablet by mouth 5 (five) times a day. Indication: chronic back pain, Disp: , Rfl:   •  nitroglycerin (NITROSTAT) 0.3 MG SL tablet, Place 0.3 mg under the tongue as needed for chest pain. (may repeat every 5 minutes but seek medical help if pain persists after 3 tablets), Disp: , Rfl:   •  omeprazole (priLOSEC) 20 MG capsule, Take 1 capsule by mouth Daily., Disp: 60 capsule, Rfl: 5  •  prasugrel (EFFIENT) 10 MG tablet, Take 10 mg by mouth daily. Indication: blood thinner, Disp: , Rfl:   •  PROAIR  (90 Base) MCG/ACT inhaler, INHALE 2 PUFFS BY MOUTH EVERY 4-6 HOURS AS NEEDED, Disp: 8.5 g, Rfl: 5  •  ranolazine (RANEXA) 500 MG 12 hr tablet, Take 500 mg by mouth 2 (two) times a day. Med Name: Ranexa 500 mg tablet,extended release, Disp: , Rfl:   •  azelastine (ASTELIN) 0.1 % nasal spray, 2 sprays into the nostril(s) as directed by provider 2 (Two) Times a Day. Use in each nostril as directed, Disp: 30 mL, Rfl: 11  •  cefdinir (OMNICEF) 300 MG capsule, Take 1 capsule by mouth 2 (Two) Times a Day., Disp: 42 capsule, Rfl: 0    Allergies   Allergen Reactions   • Penicillins Swelling     Facial swelling       Past Medical History:   Diagnosis Date   • Acute bronchitis    • Astigmatism    • Atherosclerotic heart disease of native coronary artery without angina pectoris    • Backache    • Benign hypertension    • Common cold    • Cough    • Dyspnea    • Folliculitis    •  History of echocardiogram 03/20/2012    Echocardiogram W/O color flow 95109 (1) - LV dilstation with mild LV dysfunction with EF 45%. Septal hypokinesis. Diastolic relaxation abnormality of left ventricle. Mild mitral and tricuspid regurgitation w/mild pulmonary insufficiency.   • Myopia    • Tobacco dependence syndrome          Review of Systems   Constitutional: Negative.    HENT: Positive for postnasal drip and sneezing.    Eyes: Positive for discharge and itching.   Respiratory: Negative.    Cardiovascular: Negative.    Gastrointestinal: Negative.    Endocrine: Negative.    Genitourinary: Negative.    Musculoskeletal: Negative.    Skin: Negative.    Allergic/Immunologic: Negative.    Neurological: Positive for headaches.   Hematological: Negative.    Psychiatric/Behavioral: Negative.    All other systems reviewed and are negative.          Objective   Physical Exam   Constitutional: He appears well-developed.   HENT:   Head: Normocephalic.   Right Ear: Hearing, tympanic membrane, external ear and ear canal normal.   Left Ear: Hearing, tympanic membrane and external ear normal.   Nose: Mucosal edema, rhinorrhea and nasal septal hematoma present.       Mouth/Throat: Uvula is midline, oropharynx is clear and moist and mucous membranes are normal.       Eyes: Conjunctivae are normal.   Neck: Normal range of motion.   Neurological: He is alert.   Skin: Skin is warm.   Psychiatric: He has a normal mood and affect. Thought content normal.       No unusual postnasal drip and pharynx  Assessment/Plan   Julia was seen today for deviated septum.    Diagnoses and all orders for this visit:    Chronic pansinusitis  -     CT Sinus Without Contrast; Future    Nasal turbinate hypertrophy  -     CT Sinus Without Contrast; Future    Nasal septal deviation  -     CT Sinus Without Contrast; Future    Other orders  -     azelastine (ASTELIN) 0.1 % nasal spray; 2 sprays into the nostril(s) as directed by provider 2 (Two) Times a  Day. Use in each nostril as directed  -     cefdinir (OMNICEF) 300 MG capsule; Take 1 capsule by mouth 2 (Two) Times a Day.    Stop smoking monae smoking cessation  Call if problems  CT post treatment we discussed possible surgical options we'll try to avoid that all possible  Used for prevention with use of medications  Him back next few weeks and plan further treatment pending the CT results

## 2018-11-01 NOTE — PATIENT INSTRUCTIONS

## 2018-11-05 ENCOUNTER — TELEPHONE (OUTPATIENT)
Dept: FAMILY MEDICINE CLINIC | Facility: CLINIC | Age: 48
End: 2018-11-05

## 2018-11-05 RX ORDER — PERMETHRIN 50 MG/G
CREAM TOPICAL
Qty: 60 G | Refills: 5 | Status: SHIPPED | OUTPATIENT
Start: 2018-11-05 | End: 2018-11-21

## 2018-11-05 NOTE — TELEPHONE ENCOUNTER
PHILIP SCHAFFER=WIFE OF IMREYA SCHAFFER HAS CALLED ASKING IF DR BOWEN WILL SEND A PRESP FOR THE SCABIES TREATMENT TO [facility]...HIS COUSIN THAT WORKS AT NURSING HOME HAS BEEN TO THEIR HOUSE RECENTLY AND BROUGHT IN THE SCABIES. NOW THEY BOTH ARE ITCHING..WILL DR BOWEN SEND FOR HER TOO?  62  CALLBACK

## 2018-11-19 ENCOUNTER — HOSPITAL ENCOUNTER (OUTPATIENT)
Dept: CT IMAGING | Facility: HOSPITAL | Age: 48
Discharge: HOME | End: 2018-11-19
Admitting: OTOLARYNGOLOGY

## 2018-11-19 DIAGNOSIS — J34.3 NASAL TURBINATE HYPERTROPHY: ICD-10-CM

## 2018-11-19 DIAGNOSIS — J32.4 CHRONIC PANSINUSITIS: ICD-10-CM

## 2018-11-19 DIAGNOSIS — J34.2 NASAL SEPTAL DEVIATION: ICD-10-CM

## 2018-11-19 PROCEDURE — 70486 CT MAXILLOFACIAL W/O DYE: CPT

## 2018-11-20 ENCOUNTER — PREP FOR SURGERY (OUTPATIENT)
Dept: OTHER | Facility: HOSPITAL | Age: 48
End: 2018-11-20

## 2018-11-20 ENCOUNTER — OFFICE VISIT (OUTPATIENT)
Dept: OTOLARYNGOLOGY | Facility: CLINIC | Age: 48
End: 2018-11-20

## 2018-11-20 VITALS — WEIGHT: 155 LBS | HEIGHT: 68 IN | TEMPERATURE: 97.4 F | BODY MASS INDEX: 23.49 KG/M2

## 2018-11-20 DIAGNOSIS — J34.89 NASAL OBSTRUCTION: ICD-10-CM

## 2018-11-20 DIAGNOSIS — J34.2 NASAL SEPTAL DEVIATION: Primary | ICD-10-CM

## 2018-11-20 DIAGNOSIS — J32.2 CHRONIC ETHMOIDAL SINUSITIS: Primary | ICD-10-CM

## 2018-11-20 DIAGNOSIS — J32.1 CHRONIC FRONTAL SINUSITIS: ICD-10-CM

## 2018-11-20 DIAGNOSIS — J32.2 CHRONIC ETHMOIDAL SINUSITIS: ICD-10-CM

## 2018-11-20 DIAGNOSIS — J34.3 NASAL TURBINATE HYPERTROPHY: ICD-10-CM

## 2018-11-20 DIAGNOSIS — J32.0 CHRONIC MAXILLARY SINUSITIS: ICD-10-CM

## 2018-11-20 DIAGNOSIS — J34.2 NASAL SEPTAL DEVIATION: ICD-10-CM

## 2018-11-20 PROCEDURE — 99214 OFFICE O/P EST MOD 30 MIN: CPT | Performed by: OTOLARYNGOLOGY

## 2018-11-20 RX ORDER — CLINDAMYCIN PHOSPHATE 900 MG/50ML
900 INJECTION INTRAVENOUS ONCE
Status: CANCELLED | OUTPATIENT
Start: 2018-11-27 | End: 2018-11-20

## 2018-11-20 RX ORDER — DEXAMETHASONE SODIUM PHOSPHATE 4 MG/ML
8 INJECTION, SOLUTION INTRA-ARTICULAR; INTRALESIONAL; INTRAMUSCULAR; INTRAVENOUS; SOFT TISSUE ONCE
Status: CANCELLED | OUTPATIENT
Start: 2018-11-27

## 2018-11-20 RX ORDER — OXYMETAZOLINE HYDROCHLORIDE 0.05 G/100ML
2 SPRAY NASAL
Status: CANCELLED | OUTPATIENT
Start: 2018-11-27 | End: 2018-11-30

## 2018-11-20 NOTE — PROGRESS NOTES
Subjective   Julia Gay is a 48 y.o. male.   F/u sinus  History of Present Illness   And continues to have facial pain pressure difficulty breathing through his nose, difficulty sense of smell has a history of trauma to his nose the past has trouble breathing through his nose particularly in the right area and he has history of cardiac history but it's stable his been on medications and had come off those for bleeding prevention.  He is not having the swelling or fever chills and says he took all his medications regularly comes back for review the CT scan      The following portions of the patient's history were reviewed and updated as appropriate: allergies, current medications, past family history, past medical history, past social history, past surgical history and problem list.      Julia Gay reports that he has been smoking.  He has been smoking about 1.00 pack per day. he has never used smokeless tobacco. He reports that he does not drink alcohol or use drugs.  Patient is a tobacco user and has been counseled for use of tobacco products    Family History   Problem Relation Age of Onset   • Heart disease Mother    • Hypertension Mother    • Arthritis Mother    • Heart disease Father    • Hypertension Father    • Arthritis Father    • Hypertension Sister    • Heart disease Sister    • Heart disease Brother    • Hypertension Brother          Current Outpatient Medications:   •  albuterol (PROVENTIL HFA;VENTOLIN HFA) 108 (90 Base) MCG/ACT inhaler, Inhale 2 puffs Every 4 (Four) Hours As Needed for Wheezing., Disp: 18 g, Rfl: 11  •  ASPIRIN PO, Take 1 tablet by mouth daily. Indication: heart, Disp: , Rfl:   •  atorvastatin (LIPITOR) 20 MG tablet, Take 20 mg by mouth every night. at bedtime; Indication: cholesterol, Disp: , Rfl:   •  azelastine (ASTELIN) 0.1 % nasal spray, 2 sprays into the nostril(s) as directed by provider 2 (Two) Times a Day. Use in each nostril as directed, Disp: 30 mL,  Rfl: 11  •  carvedilol (COREG) 3.125 MG tablet, Take 3.125 mg by mouth Daily., Disp: , Rfl:   •  citalopram (CeleXA) 40 MG tablet, Take 1 tablet by mouth Daily., Disp: 30 tablet, Rfl: 5  •  cyclobenzaprine (FLEXERIL) 5 MG tablet, Take 5 mg by mouth Daily., Disp: , Rfl:   •  fluticasone (FLONASE) 50 MCG/ACT nasal spray, SHAKE LIQUID AND USE 2 SPRAYS IN EACH NOSTRIL DAILY, Disp: 16 mL, Rfl: 0  •  gabapentin (NEURONTIN) 300 MG capsule, Take 300 mg by mouth daily., Disp: , Rfl:   •  Hydrocodone-Acetaminophen (LORTAB 10)  MG per tablet, Take 1 tablet by mouth 5 (five) times a day. Indication: chronic back pain, Disp: , Rfl:   •  nitroglycerin (NITROSTAT) 0.3 MG SL tablet, Place 0.3 mg under the tongue as needed for chest pain. (may repeat every 5 minutes but seek medical help if pain persists after 3 tablets), Disp: , Rfl:   •  omeprazole (priLOSEC) 20 MG capsule, Take 1 capsule by mouth Daily., Disp: 60 capsule, Rfl: 5  •  permethrin (ELIMITE) 5 % cream, Apply neck down wash off after 6-8hrs repeat in wk, Disp: 60 g, Rfl: 5  •  prasugrel (EFFIENT) 10 MG tablet, Take 10 mg by mouth daily. Indication: blood thinner, Disp: , Rfl:   •  PROAIR  (90 Base) MCG/ACT inhaler, INHALE 2 PUFFS BY MOUTH EVERY 4-6 HOURS AS NEEDED, Disp: 8.5 g, Rfl: 5  •  ranolazine (RANEXA) 500 MG 12 hr tablet, Take 500 mg by mouth 2 (two) times a day. Med Name: Ranexa 500 mg tablet,extended release, Disp: , Rfl:     Allergies   Allergen Reactions   • Penicillins Swelling     Facial swelling       Past Medical History:   Diagnosis Date   • Acute bronchitis    • Astigmatism    • Atherosclerotic heart disease of native coronary artery without angina pectoris    • Backache    • Benign hypertension    • Common cold    • Cough    • Dyspnea    • Folliculitis    • History of echocardiogram 03/20/2012    Echocardiogram W/O color flow 02836 (1) - LV dilstation with mild LV dysfunction with EF 45%. Septal hypokinesis. Diastolic relaxation  abnormality of left ventricle. Mild mitral and tricuspid regurgitation w/mild pulmonary insufficiency.   • Myopia    • Tobacco dependence syndrome          Review of Systems   HENT: Positive for congestion, nosebleeds, postnasal drip, rhinorrhea and sinus pain. Negative for facial swelling.          Decreased sense of smell   Respiratory: Negative for apnea.    Cardiovascular: Negative for chest pain.   Allergic/Immunologic: Positive for environmental allergies.   Neurological: Negative for facial asymmetry.   Hematological: Negative for adenopathy.           Objective   Physical Exam   Constitutional: He is oriented to person, place, and time. He appears well-developed and well-nourished.   HENT:   Head: Normocephalic and atraumatic.   Right Ear: Hearing, tympanic membrane, external ear and ear canal normal.   Left Ear: Hearing, tympanic membrane, external ear and ear canal normal.   Nose: Nose normal. No mucosal edema, rhinorrhea, nasal deformity or septal deviation. No epistaxis. Right sinus exhibits no maxillary sinus tenderness and no frontal sinus tenderness. Left sinus exhibits no maxillary sinus tenderness and no frontal sinus tenderness.       Mouth/Throat: Uvula is midline, oropharynx is clear and moist and mucous membranes are normal. No trismus in the jaw. Normal dentition. No oropharyngeal exudate or posterior oropharyngeal edema.       Eyes: Conjunctivae and EOM are normal.   Neck: Normal range of motion. Neck supple. No JVD present. No tracheal deviation present. No thyromegaly present.   Cardiovascular: Normal rate and regular rhythm.   Pulmonary/Chest: Effort normal and breath sounds normal.   Musculoskeletal: Normal range of motion.   Lymphadenopathy:        Head (right side): No submental, no submandibular, no tonsillar, no preauricular, no posterior auricular and no occipital adenopathy present.        Head (left side): No submental, no submandibular, no tonsillar, no preauricular, no posterior  auricular and no occipital adenopathy present.     He has no cervical adenopathy.        Right cervical: No superficial cervical, no deep cervical and no posterior cervical adenopathy present.       Left cervical: No superficial cervical, no deep cervical and no posterior cervical adenopathy present.   Neurological: He is alert and oriented to person, place, and time. No cranial nerve deficit.   Skin: Skin is warm.   Psychiatric: He has a normal mood and affect. His speech is normal and behavior is normal. Thought content normal.   Nursing note and vitals reviewed.    Result        EXAMINATION:  CT sinus                    Indication:  Sinusitis,      Sinusitis, <=12w, uncomplicated,  J32.4 Chronic pansinusitis J34.3 Hypertrophy of nasal turbinates  J34.2 Deviated nasal septum      History:  Correlative imaging:  none        Technique:  iv contrast:  none                  This exam was performed according to the departmental  dose-optimization program which includes automated exposure  control, adjustment of the mA and/or kV according to patient size  and/or use of iterative reconstruction technique.         FINDINGS:       Sinuses:                   Frontal sinuses:  Complete opacification bilaterally           Ethmoidal sinuses:  Opacification on the right                Maxillary sinuses:  Opacification on the right             Sphenoidal sinuses:  negative              Nasal:    Osteomeatal units (OMU):  Within normal limits on the left, the  right cannot be assessed due to the presence of soft tissue  density.               Amairani bullosa:  Absent    Nasal septum:  0.6 cm rightward deviation anteriorly and 0.3 cm  deviation to the left, superiorly.     Misc:  (as visualized)     Orbits:  unremarkable      Mastoids:  unremarkable      Brain:  unremarkable            mandible: There is a well-circumscribed lytic bone lesion  involving the body of the left side of the mandible measuring 3.1  x 1.7 x 1.1 cm, no  evidence of an abnormal periosteal reaction.  This lytic lesion encroaches upon the adjacent molar.                  IMPRESSION:  CONCLUSION:         1. Complete opacification of the frontal sinuses, right maxillary  sinus, in primarily the right ethmoidal sinus air cells.  2. Deviated nasal septum.  3. Right ostiomeatal unit not evaluated due to the presence of  soft tissue density.                                       Electronically signed by:  DIXIE Salguero MD  11/19/2018 2:13  PM CST Workstation: [id number]   Imaging     CT Sinus Without Contrast (Order: [id number]) - 11/19/2018   Reprint Order Requisition     CT Sinus Without Contrast (Order #[id number]) on 11/19/18   Signed by     Signed Date/Time  Phone Pager   JIAN SALGUERO 11/19/2018 14:13 [phone]    Exam Information     Status Exam Begun  Exam Ended    Final [99] 11/19/2018 12:26 11/19/2018 12:32   Reviewed by     Som Redd MD 11/19/2018 17:23   External Results Report     Open External Results Report    Encounter     View Encounter          Intra Procedure Documentation     Intra Procedure Documentation   Order-Level Documents:     There are no order-level documents.   Encounter-Level Documents:     There are no encounter-level documents.   Implants     None   Patient Care Timeline     No data selected in time range   Reason For Exam   Priority: Routine   Sinusitis, <=12w, uncomplicated   Dx: Chronic pansinusitis [J32.4 (ICD-10-CM)]; Nasal turbinate hypertrophy [J34.3 (ICD-10-CM)]; Nasal septal deviation [J34.2 (ICD-10-CM)]   Results Routing Tracking     View Results Routing Information   Order Report     Order Details           Assessment/Plan   Julia was seen today for follow-up.    Diagnoses and all orders for this visit:    Nasal septal deviation    Nasal turbinate hypertrophy    Chronic frontal sinusitis    Chronic ethmoidal sinusitis    Chronic maxillary sinusitis    Nasal obstruction      Scearce the CT findings with  patient and the actual CT he was created nasal surgery to try and improve his airway consensus melatonin and no guarantees a do that there could be changes appearance septal perforation need for secondary surgery.  Discussed both external surgery to the frontal sinus as well as internal surgery he wants to go with head with endoscopic approach.  Term there are significant risks including brain damage, eye damage, decreased vision, blindness, CSF leak, meningitis possible need for other surgeries the plan is to do right maxillary ethmoid and frontal sinus surgery as well as left frontal sinus surgery will turbinate and septal surgery he understands recovery limitations.  We told her we happy clearance from his cardiologist Dr. Tirado then we can try and see if he can come off his blood thinners and workup surgeon near future he wanted to do soon as possible we discussed limitations as well as questions were answered

## 2018-11-20 NOTE — PATIENT INSTRUCTIONS

## 2018-11-21 ENCOUNTER — TELEPHONE (OUTPATIENT)
Dept: OTOLARYNGOLOGY | Facility: CLINIC | Age: 48
End: 2018-11-21

## 2018-11-21 ENCOUNTER — APPOINTMENT (OUTPATIENT)
Dept: PREADMISSION TESTING | Facility: HOSPITAL | Age: 48
End: 2018-11-21

## 2018-11-21 VITALS
OXYGEN SATURATION: 97 % | HEIGHT: 68 IN | HEART RATE: 57 BPM | DIASTOLIC BLOOD PRESSURE: 74 MMHG | RESPIRATION RATE: 12 BRPM | SYSTOLIC BLOOD PRESSURE: 134 MMHG | WEIGHT: 155 LBS | BODY MASS INDEX: 23.49 KG/M2

## 2018-11-21 RX ORDER — FLUTICASONE PROPIONATE 50 MCG
2 SPRAY, SUSPENSION (ML) NASAL DAILY
COMMUNITY
End: 2018-11-27 | Stop reason: HOSPADM

## 2018-11-21 RX ORDER — SODIUM CHLORIDE, SODIUM GLUCONATE, SODIUM ACETATE, POTASSIUM CHLORIDE, AND MAGNESIUM CHLORIDE 526; 502; 368; 37; 30 MG/100ML; MG/100ML; MG/100ML; MG/100ML; MG/100ML
1000 INJECTION, SOLUTION INTRAVENOUS CONTINUOUS
Status: CANCELLED | OUTPATIENT
Start: 2018-11-27

## 2018-11-21 NOTE — TELEPHONE ENCOUNTER
Spoke to patient and asked him to stop his coumadin and aspirin until after his surgery.  He understands and is agreeable.        Dr. Malik gave surgical clearance.

## 2018-11-26 ENCOUNTER — ANESTHESIA EVENT (OUTPATIENT)
Dept: PERIOP | Facility: HOSPITAL | Age: 48
End: 2018-11-26

## 2018-11-26 NOTE — H&P
Subjective   Julia Gay is a 48 y.o. male.   F/u sinus   History of Present Illness   And continues to have facial pain pressure difficulty breathing through his nose, difficulty sense of smell has a history of trauma to his nose the past has trouble breathing through his nose particularly in the right area and he has history of cardiac history but it's stable his been on medications and had come off those for bleeding prevention. He is not having the swelling or fever chills and says he took all his medications regularly comes back for review the CT scan   The following portions of the patient's history were reviewed and updated as appropriate: allergies, current medications, past family history, past medical history, past social history, past surgical history and problem list.   Julia Gay reports that he has been smoking. He has been smoking about 1.00 pack per day. he has never used smokeless tobacco. He reports that he does not drink alcohol or use drugs.   Patient is a tobacco user and has been counseled for use of tobacco products         Family History   Problem Relation Age of Onset   • Heart disease Mother    • Hypertension Mother    • Arthritis Mother    • Heart disease Father    • Hypertension Father    • Arthritis Father    • Hypertension Sister    • Heart disease Sister    • Heart disease Brother    • Hypertension Brother        Current Outpatient Medications:   • albuterol (PROVENTIL HFA;VENTOLIN HFA) 108 (90 Base) MCG/ACT inhaler, Inhale 2 puffs Every 4 (Four) Hours As Needed for Wheezing., Disp: 18 g, Rfl: 11   • ASPIRIN PO, Take 1 tablet by mouth daily. Indication: heart, Disp: , Rfl:   • atorvastatin (LIPITOR) 20 MG tablet, Take 20 mg by mouth every night. at bedtime; Indication: cholesterol, Disp: , Rfl:   • azelastine (ASTELIN) 0.1 % nasal spray, 2 sprays into the nostril(s) as directed by provider 2 (Two) Times a Day. Use in each nostril as directed, Disp: 30 mL, Rfl: 11    • carvedilol (COREG) 3.125 MG tablet, Take 3.125 mg by mouth Daily., Disp: , Rfl:   • citalopram (CeleXA) 40 MG tablet, Take 1 tablet by mouth Daily., Disp: 30 tablet, Rfl: 5   • cyclobenzaprine (FLEXERIL) 5 MG tablet, Take 5 mg by mouth Daily., Disp: , Rfl:   • fluticasone (FLONASE) 50 MCG/ACT nasal spray, SHAKE LIQUID AND USE 2 SPRAYS IN EACH NOSTRIL DAILY, Disp: 16 mL, Rfl: 0   • gabapentin (NEURONTIN) 300 MG capsule, Take 300 mg by mouth daily., Disp: , Rfl:   • Hydrocodone-Acetaminophen (LORTAB 10)  MG per tablet, Take 1 tablet by mouth 5 (five) times a day. Indication: chronic back pain, Disp: , Rfl:   • nitroglycerin (NITROSTAT) 0.3 MG SL tablet, Place 0.3 mg under the tongue as needed for chest pain. (may repeat every 5 minutes but seek medical help if pain persists after 3 tablets), Disp: , Rfl:   • omeprazole (priLOSEC) 20 MG capsule, Take 1 capsule by mouth Daily., Disp: 60 capsule, Rfl: 5   • permethrin (ELIMITE) 5 % cream, Apply neck down wash off after 6-8hrs repeat in wk, Disp: 60 g, Rfl: 5   • prasugrel (EFFIENT) 10 MG tablet, Take 10 mg by mouth daily. Indication: blood thinner, Disp: , Rfl:   • PROAIR  (90 Base) MCG/ACT inhaler, INHALE 2 PUFFS BY MOUTH EVERY 4-6 HOURS AS NEEDED, Disp: 8.5 g, Rfl: 5   • ranolazine (RANEXA) 500 MG 12 hr tablet, Take 500 mg by mouth 2 (two) times a day. Med Name: Ranexa 500 mg tablet,extended release, Disp: , Rfl:         Allergies   Allergen Reactions   • Penicillins Swelling     Facial swelling     Medical History        Past Medical History:   Diagnosis Date   • Acute bronchitis    • Astigmatism    • Atherosclerotic heart disease of native coronary artery without angina pectoris    • Backache    • Benign hypertension    • Common cold    • Cough    • Dyspnea    • Folliculitis    • History of echocardiogram 03/20/2012    Echocardiogram W/O color flow 27214 (1) - LV dilstation with mild LV dysfunction with EF 45%. Septal hypokinesis. Diastolic  relaxation abnormality of left ventricle. Mild mitral and tricuspid regurgitation w/mild pulmonary insufficiency.   • Myopia    • Tobacco dependence syndrome      Review of Systems   HENT: Positive for congestion, nosebleeds, postnasal drip, rhinorrhea and sinus pain. Negative for facial swelling.   Decreased sense of smell   Respiratory: Negative for apnea.   Cardiovascular: Negative for chest pain.   Allergic/Immunologic: Positive for environmental allergies.   Neurological: Negative for facial asymmetry.   Hematological: Negative for adenopathy.     Objective   Physical Exam   Constitutional: He is oriented to person, place, and time. He appears well-developed and well-nourished.   HENT:   Head: Normocephalic and atraumatic.   Right Ear: Hearing, tympanic membrane, external ear and ear canal normal.   Left Ear: Hearing, tympanic membrane, external ear and ear canal normal.   Nose: Nose normal. No mucosal edema, rhinorrhea, nasal deformity or septal deviation. No epistaxis. Right sinus exhibits no maxillary sinus tenderness and no frontal sinus tenderness. Left sinus exhibits no maxillary sinus tenderness and no frontal sinus tenderness.       Mouth/Throat: Uvula is midline, oropharynx is clear and moist and mucous membranes are normal. No trismus in the jaw. Normal dentition. No oropharyngeal exudate or posterior oropharyngeal edema.       Eyes: Conjunctivae and EOM are normal.   Neck: Normal range of motion. Neck supple. No JVD present. No tracheal deviation present. No thyromegaly present.   Cardiovascular: Normal rate and regular rhythm.   Pulmonary/Chest: Effort normal and breath sounds normal.   Musculoskeletal: Normal range of motion.   Lymphadenopathy:   Head (right side): No submental, no submandibular, no tonsillar, no preauricular, no posterior auricular and no occipital adenopathy present.   Head (left side): No submental, no submandibular, no tonsillar, no preauricular, no posterior auricular and no  occipital adenopathy present.   He has no cervical adenopathy.   Right cervical: No superficial cervical, no deep cervical and no posterior cervical adenopathy present.  Left cervical: No superficial cervical, no deep cervical and no posterior cervical adenopathy present.   Neurological: He is alert and oriented to person, place, and time. No cranial nerve deficit.   Skin: Skin is warm.   Psychiatric: He has a normal mood and affect. His speech is normal and behavior is normal. Thought content normal.   Nursing note and vitals reviewed.     Result   EXAMINATION: CT sinus   Indication: Sinusitis, Sinusitis, <=12w, uncomplicated,   J32.4 Chronic pansinusitis J34.3 Hypertrophy of nasal turbinates   J34.2 Deviated nasal septum   History:   Correlative imaging: none   Technique: iv contrast: none   This exam was performed according to the departmental   dose-optimization program which includes automated exposure   control, adjustment of the mA and/or kV according to patient size   and/or use of iterative reconstruction technique.   FINDINGS:   Sinuses:   Frontal sinuses: Complete opacification bilaterally   Ethmoidal sinuses: Opacification on the right   Maxillary sinuses: Opacification on the right   Sphenoidal sinuses: negative   Nasal:   Osteomeatal units (OMU): Within normal limits on the left, the   right cannot be assessed due to the presence of soft tissue   density.   Amairani bullosa: Absent   Nasal septum: 0.6 cm rightward deviation anteriorly and 0.3 cm   deviation to the left, superiorly.   Misc: (as visualized)   Orbits: unremarkable   Mastoids: unremarkable   Brain: unremarkable   mandible: There is a well-circumscribed lytic bone lesion   involving the body of the left side of the mandible measuring 3.1   x 1.7 x 1.1 cm, no evidence of an abnormal periosteal reaction.   This lytic lesion encroaches upon the adjacent molar.   IMPRESSION:   CONCLUSION:   1. Complete opacification of the frontal sinuses, right  maxillary   sinus, in primarily the right ethmoidal sinus air cells.   2. Deviated nasal septum.   3. Right ostiomeatal unit not evaluated due to the presence of   soft tissue density.   Electronically signed by: DIXIE Beatty MD 11/19/2018 2:13   PM CST Workstation: [id number]   Imaging      Assessment/Plan   Julia was seen today for follow-up.   Diagnoses and all orders for this visit:   Nasal septal deviation   Nasal turbinate hypertrophy   Chronic frontal sinusitis   Chronic ethmoidal sinusitis   Chronic maxillary sinusitis   Nasal obstruction     Scearce the CT findings with patient and the actual CT he was created nasal surgery to try and improve his airway consensus melatonin and no guarantees a do that there could be changes appearance septal perforation need for secondary surgery. Discussed both external surgery to the frontal sinus as well as internal surgery he wants to go with head with endoscopic approach. Term there are significant risks including brain damage, eye damage, decreased vision, blindness, CSF leak, meningitis possible need for other surgeries the plan is to do right maxillary ethmoid and frontal sinus surgery as well as left frontal sinus surgery will turbinate and septal surgery he understands recovery limitations. We told her we happy clearance from his cardiologist Dr. Springer then we can try and see if he can come off his blood thinners and workup surgeon near future he wanted to do soon as possible we discussed limitations as well as questions were answered   The pt is also referred to Oral  Surgery regarding Mandible findings on CT

## 2018-11-27 ENCOUNTER — HOSPITAL ENCOUNTER (OUTPATIENT)
Facility: HOSPITAL | Age: 48
Setting detail: HOSPITAL OUTPATIENT SURGERY
Discharge: HOME | End: 2018-11-27
Attending: OTOLARYNGOLOGY | Admitting: OTOLARYNGOLOGY

## 2018-11-27 ENCOUNTER — ANESTHESIA (OUTPATIENT)
Dept: PERIOP | Facility: HOSPITAL | Age: 48
End: 2018-11-27

## 2018-11-27 VITALS
DIASTOLIC BLOOD PRESSURE: 61 MMHG | BODY MASS INDEX: 22.75 KG/M2 | SYSTOLIC BLOOD PRESSURE: 124 MMHG | OXYGEN SATURATION: 96 % | RESPIRATION RATE: 16 BRPM | HEART RATE: 71 BPM | HEIGHT: 68 IN | TEMPERATURE: 97.7 F | WEIGHT: 150.13 LBS

## 2018-11-27 DIAGNOSIS — J32.1 CHRONIC FRONTAL SINUSITIS: ICD-10-CM

## 2018-11-27 DIAGNOSIS — J32.0 CHRONIC MAXILLARY SINUSITIS: ICD-10-CM

## 2018-11-27 DIAGNOSIS — J34.2 NASAL SEPTAL DEVIATION: ICD-10-CM

## 2018-11-27 DIAGNOSIS — J34.3 NASAL TURBINATE HYPERTROPHY: ICD-10-CM

## 2018-11-27 DIAGNOSIS — J34.89 NASAL OBSTRUCTION: ICD-10-CM

## 2018-11-27 DIAGNOSIS — J32.2 CHRONIC ETHMOIDAL SINUSITIS: ICD-10-CM

## 2018-11-27 PROCEDURE — 25010000002 FENTANYL CITRATE (PF) 100 MCG/2ML SOLUTION: Performed by: NURSE ANESTHETIST, CERTIFIED REGISTERED

## 2018-11-27 PROCEDURE — 25010000002 MIDAZOLAM PER 1 MG: Performed by: NURSE ANESTHETIST, CERTIFIED REGISTERED

## 2018-11-27 PROCEDURE — 25010000002 DEXAMETHASONE PER 1 MG: Performed by: OTOLARYNGOLOGY

## 2018-11-27 PROCEDURE — 25010000002 DEXAMETHASONE PER 1 MG: Performed by: NURSE ANESTHETIST, CERTIFIED REGISTERED

## 2018-11-27 PROCEDURE — 88305 TISSUE EXAM BY PATHOLOGIST: CPT | Performed by: PATHOLOGY

## 2018-11-27 PROCEDURE — 25010000002 ONDANSETRON PER 1 MG: Performed by: NURSE ANESTHETIST, CERTIFIED REGISTERED

## 2018-11-27 PROCEDURE — 31253 NSL/SINS NDSC TOTAL: CPT | Performed by: OTOLARYNGOLOGY

## 2018-11-27 PROCEDURE — 25010000002 PROPOFOL 10 MG/ML EMULSION: Performed by: NURSE ANESTHETIST, CERTIFIED REGISTERED

## 2018-11-27 PROCEDURE — 31267 ENDOSCOPY MAXILLARY SINUS: CPT | Performed by: OTOLARYNGOLOGY

## 2018-11-27 PROCEDURE — 25010000002 HYDROMORPHONE 1 MG/ML SOLUTION: Performed by: NURSE ANESTHETIST, CERTIFIED REGISTERED

## 2018-11-27 PROCEDURE — 30520 REPAIR OF NASAL SEPTUM: CPT | Performed by: OTOLARYNGOLOGY

## 2018-11-27 PROCEDURE — 25010000002 SUCCINYLCHOLINE PER 20 MG: Performed by: NURSE ANESTHETIST, CERTIFIED REGISTERED

## 2018-11-27 PROCEDURE — 30130 EXCISE INFERIOR TURBINATE: CPT | Performed by: OTOLARYNGOLOGY

## 2018-11-27 PROCEDURE — 88305 TISSUE EXAM BY PATHOLOGIST: CPT | Performed by: OTOLARYNGOLOGY

## 2018-11-27 RX ORDER — CLINDAMYCIN PHOSPHATE 900 MG/50ML
900 INJECTION INTRAVENOUS ONCE
Status: DISCONTINUED | OUTPATIENT
Start: 2018-11-27 | End: 2018-11-27 | Stop reason: HOSPADM

## 2018-11-27 RX ORDER — ROCURONIUM BROMIDE 10 MG/ML
INJECTION, SOLUTION INTRAVENOUS AS NEEDED
Status: DISCONTINUED | OUTPATIENT
Start: 2018-11-27 | End: 2018-11-27 | Stop reason: SURG

## 2018-11-27 RX ORDER — FLUMAZENIL 0.1 MG/ML
0.2 INJECTION INTRAVENOUS AS NEEDED
Status: DISCONTINUED | OUTPATIENT
Start: 2018-11-27 | End: 2018-11-27 | Stop reason: HOSPADM

## 2018-11-27 RX ORDER — CLINDAMYCIN HYDROCHLORIDE 150 MG/1
150 CAPSULE ORAL EVERY 6 HOURS
Qty: 40 CAPSULE | Refills: 0 | OUTPATIENT
Start: 2018-11-27 | End: 2018-12-21 | Stop reason: HOSPADM

## 2018-11-27 RX ORDER — LIDOCAINE HYDROCHLORIDE AND EPINEPHRINE 10; 10 MG/ML; UG/ML
INJECTION, SOLUTION INFILTRATION; PERINEURAL AS NEEDED
Status: DISCONTINUED | OUTPATIENT
Start: 2018-11-27 | End: 2018-11-27 | Stop reason: HOSPADM

## 2018-11-27 RX ORDER — MIDAZOLAM HYDROCHLORIDE 1 MG/ML
INJECTION INTRAMUSCULAR; INTRAVENOUS AS NEEDED
Status: DISCONTINUED | OUTPATIENT
Start: 2018-11-27 | End: 2018-11-27 | Stop reason: SURG

## 2018-11-27 RX ORDER — OXYCODONE AND ACETAMINOPHEN 7.5; 325 MG/1; MG/1
1-2 TABLET ORAL EVERY 4 HOURS PRN
Qty: 24 TABLET | Refills: 0 | Status: SHIPPED | OUTPATIENT
Start: 2018-11-27 | End: 2018-12-03

## 2018-11-27 RX ORDER — MEPERIDINE HYDROCHLORIDE 50 MG/ML
12.5 INJECTION INTRAMUSCULAR; INTRAVENOUS; SUBCUTANEOUS
Status: DISCONTINUED | OUTPATIENT
Start: 2018-11-27 | End: 2018-11-27 | Stop reason: HOSPADM

## 2018-11-27 RX ORDER — ACETAMINOPHEN 325 MG/1
650 TABLET ORAL ONCE AS NEEDED
Status: DISCONTINUED | OUTPATIENT
Start: 2018-11-27 | End: 2018-11-27 | Stop reason: HOSPADM

## 2018-11-27 RX ORDER — SODIUM CHLORIDE, SODIUM GLUCONATE, SODIUM ACETATE, POTASSIUM CHLORIDE, AND MAGNESIUM CHLORIDE 526; 502; 368; 37; 30 MG/100ML; MG/100ML; MG/100ML; MG/100ML; MG/100ML
1000 INJECTION, SOLUTION INTRAVENOUS CONTINUOUS
Status: DISCONTINUED | OUTPATIENT
Start: 2018-11-27 | End: 2018-11-27 | Stop reason: HOSPADM

## 2018-11-27 RX ORDER — OXYCODONE AND ACETAMINOPHEN 7.5; 325 MG/1; MG/1
1 TABLET ORAL EVERY 4 HOURS PRN
Status: DISCONTINUED | OUTPATIENT
Start: 2018-11-27 | End: 2018-11-27 | Stop reason: HOSPADM

## 2018-11-27 RX ORDER — NALOXONE HCL 0.4 MG/ML
0.2 VIAL (ML) INJECTION AS NEEDED
Status: DISCONTINUED | OUTPATIENT
Start: 2018-11-27 | End: 2018-11-27 | Stop reason: HOSPADM

## 2018-11-27 RX ORDER — EPHEDRINE SULFATE 50 MG/ML
5 INJECTION, SOLUTION INTRAVENOUS ONCE AS NEEDED
Status: DISCONTINUED | OUTPATIENT
Start: 2018-11-27 | End: 2018-11-27 | Stop reason: HOSPADM

## 2018-11-27 RX ORDER — LIDOCAINE HYDROCHLORIDE 20 MG/ML
INJECTION, SOLUTION INFILTRATION; PERINEURAL AS NEEDED
Status: DISCONTINUED | OUTPATIENT
Start: 2018-11-27 | End: 2018-11-27 | Stop reason: SURG

## 2018-11-27 RX ORDER — DIPHENHYDRAMINE HYDROCHLORIDE 50 MG/ML
12.5 INJECTION INTRAMUSCULAR; INTRAVENOUS
Status: DISCONTINUED | OUTPATIENT
Start: 2018-11-27 | End: 2018-11-27 | Stop reason: HOSPADM

## 2018-11-27 RX ORDER — DEXAMETHASONE SODIUM PHOSPHATE 4 MG/ML
8 INJECTION, SOLUTION INTRA-ARTICULAR; INTRALESIONAL; INTRAMUSCULAR; INTRAVENOUS; SOFT TISSUE ONCE
Status: COMPLETED | OUTPATIENT
Start: 2018-11-27 | End: 2018-11-27

## 2018-11-27 RX ORDER — OXYMETAZOLINE HYDROCHLORIDE 0.05 G/100ML
SPRAY NASAL AS NEEDED
Status: DISCONTINUED | OUTPATIENT
Start: 2018-11-27 | End: 2018-11-27 | Stop reason: HOSPADM

## 2018-11-27 RX ORDER — ACETAMINOPHEN 650 MG/1
650 SUPPOSITORY RECTAL ONCE AS NEEDED
Status: DISCONTINUED | OUTPATIENT
Start: 2018-11-27 | End: 2018-11-27 | Stop reason: HOSPADM

## 2018-11-27 RX ORDER — PROPOFOL 10 MG/ML
VIAL (ML) INTRAVENOUS AS NEEDED
Status: DISCONTINUED | OUTPATIENT
Start: 2018-11-27 | End: 2018-11-27 | Stop reason: SURG

## 2018-11-27 RX ORDER — SUCCINYLCHOLINE CHLORIDE 20 MG/ML
INJECTION INTRAMUSCULAR; INTRAVENOUS AS NEEDED
Status: DISCONTINUED | OUTPATIENT
Start: 2018-11-27 | End: 2018-11-27 | Stop reason: SURG

## 2018-11-27 RX ORDER — OXYMETAZOLINE HYDROCHLORIDE 0.05 G/100ML
2 SPRAY NASAL
Status: DISCONTINUED | OUTPATIENT
Start: 2018-11-27 | End: 2018-11-27 | Stop reason: HOSPADM

## 2018-11-27 RX ORDER — LABETALOL HYDROCHLORIDE 5 MG/ML
5 INJECTION, SOLUTION INTRAVENOUS
Status: DISCONTINUED | OUTPATIENT
Start: 2018-11-27 | End: 2018-11-27 | Stop reason: HOSPADM

## 2018-11-27 RX ORDER — EPHEDRINE SULFATE 50 MG/ML
INJECTION, SOLUTION INTRAVENOUS AS NEEDED
Status: DISCONTINUED | OUTPATIENT
Start: 2018-11-27 | End: 2018-11-27 | Stop reason: SURG

## 2018-11-27 RX ORDER — FENTANYL CITRATE 50 UG/ML
INJECTION, SOLUTION INTRAMUSCULAR; INTRAVENOUS AS NEEDED
Status: DISCONTINUED | OUTPATIENT
Start: 2018-11-27 | End: 2018-11-27 | Stop reason: SURG

## 2018-11-27 RX ORDER — DEXAMETHASONE SODIUM PHOSPHATE 4 MG/ML
INJECTION, SOLUTION INTRA-ARTICULAR; INTRALESIONAL; INTRAMUSCULAR; INTRAVENOUS; SOFT TISSUE AS NEEDED
Status: DISCONTINUED | OUTPATIENT
Start: 2018-11-27 | End: 2018-11-27 | Stop reason: SURG

## 2018-11-27 RX ORDER — ONDANSETRON 2 MG/ML
INJECTION INTRAMUSCULAR; INTRAVENOUS AS NEEDED
Status: DISCONTINUED | OUTPATIENT
Start: 2018-11-27 | End: 2018-11-27 | Stop reason: SURG

## 2018-11-27 RX ORDER — ONDANSETRON 2 MG/ML
4 INJECTION INTRAMUSCULAR; INTRAVENOUS ONCE AS NEEDED
Status: DISCONTINUED | OUTPATIENT
Start: 2018-11-27 | End: 2018-11-27 | Stop reason: HOSPADM

## 2018-11-27 RX ADMIN — HYDROMORPHONE HYDROCHLORIDE 0.5 MG: 1 INJECTION, SOLUTION INTRAMUSCULAR; INTRAVENOUS; SUBCUTANEOUS at 10:48

## 2018-11-27 RX ADMIN — EPHEDRINE SULFATE 10 MG: 50 INJECTION INTRAVENOUS at 08:20

## 2018-11-27 RX ADMIN — ONDANSETRON 4 MG: 2 INJECTION INTRAMUSCULAR; INTRAVENOUS at 09:30

## 2018-11-27 RX ADMIN — HYDROMORPHONE HYDROCHLORIDE 0.5 MG: 1 INJECTION, SOLUTION INTRAMUSCULAR; INTRAVENOUS; SUBCUTANEOUS at 10:38

## 2018-11-27 RX ADMIN — EPHEDRINE SULFATE 10 MG: 50 INJECTION INTRAVENOUS at 08:26

## 2018-11-27 RX ADMIN — DEXAMETHASONE SODIUM PHOSPHATE 4 MG: 4 INJECTION, SOLUTION INTRAMUSCULAR; INTRAVENOUS at 08:28

## 2018-11-27 RX ADMIN — SODIUM CHLORIDE, SODIUM GLUCONATE, SODIUM ACETATE, POTASSIUM CHLORIDE, AND MAGNESIUM CHLORIDE: 526; 502; 368; 37; 30 INJECTION, SOLUTION INTRAVENOUS at 08:03

## 2018-11-27 RX ADMIN — SODIUM CHLORIDE, SODIUM GLUCONATE, SODIUM ACETATE, POTASSIUM CHLORIDE, AND MAGNESIUM CHLORIDE: 526; 502; 368; 37; 30 INJECTION, SOLUTION INTRAVENOUS at 08:46

## 2018-11-27 RX ADMIN — ROCURONIUM BROMIDE 5 MG: 10 INJECTION INTRAVENOUS at 08:11

## 2018-11-27 RX ADMIN — OXYMETAZOLINE HYDROCHLORIDE 2 SPRAY: 5 SPRAY NASAL at 07:00

## 2018-11-27 RX ADMIN — OXYCODONE HYDROCHLORIDE AND ACETAMINOPHEN 1 TABLET: 7.5; 325 TABLET ORAL at 12:13

## 2018-11-27 RX ADMIN — SODIUM CHLORIDE, SODIUM GLUCONATE, SODIUM ACETATE, POTASSIUM CHLORIDE, AND MAGNESIUM CHLORIDE 1000 ML: 526; 502; 368; 37; 30 INJECTION, SOLUTION INTRAVENOUS at 07:02

## 2018-11-27 RX ADMIN — LIDOCAINE HYDROCHLORIDE 60 MG: 20 INJECTION, SOLUTION INFILTRATION; PERINEURAL at 08:11

## 2018-11-27 RX ADMIN — DEXAMETHASONE SODIUM PHOSPHATE 8 MG: 4 INJECTION, SOLUTION INTRAMUSCULAR; INTRAVENOUS at 07:00

## 2018-11-27 RX ADMIN — SUCCINYLCHOLINE CHLORIDE 130 MG: 20 INJECTION, SOLUTION INTRAMUSCULAR; INTRAVENOUS at 08:11

## 2018-11-27 RX ADMIN — PROPOFOL 150 MG: 10 INJECTION, EMULSION INTRAVENOUS at 08:11

## 2018-11-27 RX ADMIN — FENTANYL CITRATE 50 MCG: 50 INJECTION, SOLUTION INTRAMUSCULAR; INTRAVENOUS at 08:11

## 2018-11-27 RX ADMIN — HYDROMORPHONE HYDROCHLORIDE 0.5 MG: 1 INJECTION, SOLUTION INTRAMUSCULAR; INTRAVENOUS; SUBCUTANEOUS at 10:21

## 2018-11-27 RX ADMIN — EPHEDRINE SULFATE 5 MG: 50 INJECTION INTRAVENOUS at 08:52

## 2018-11-27 RX ADMIN — EPHEDRINE SULFATE 5 MG: 50 INJECTION INTRAVENOUS at 09:23

## 2018-11-27 RX ADMIN — EPHEDRINE SULFATE 5 MG: 50 INJECTION INTRAVENOUS at 09:13

## 2018-11-27 RX ADMIN — MIDAZOLAM HYDROCHLORIDE 2 MG: 2 INJECTION, SOLUTION INTRAMUSCULAR; INTRAVENOUS at 08:08

## 2018-11-27 NOTE — INTERVAL H&P NOTE
Pt notes no new changes.  All questions answered.  Vitals reviewed.  CT scan reviewed.  DANYELLE Redd MD

## 2018-11-27 NOTE — ANESTHESIA PROCEDURE NOTES
ANESTHESIA INTUBATION  Urgency: elective    Airway not difficult    General Information and Staff    Patient location during procedure: OR  CRNA: Melissa Domínguez CRNA    Indications and Patient Condition  Indications for airway management: airway protection    Preoxygenated: yes  Mask difficulty assessment: 1 - vent by mask    Final Airway Details  Final airway type: endotracheal airway      Successful airway: ETT  Cuffed: yes   Successful intubation technique: direct laryngoscopy  Facilitating devices/methods: intubating stylet  Blade: Roverto  Blade size: 3  ETT size (mm): 7.5  Cormack-Lehane Classification: grade I - full view of glottis  Placement verified by: chest auscultation and capnometry   Measured from: lips  ETT to lips (cm): 22  Number of attempts at approach: 1

## 2018-11-27 NOTE — ANESTHESIA PREPROCEDURE EVALUATION
Anesthesia Evaluation     Patient summary reviewed   NPO Solid Status: > 8 hours  NPO Liquid Status: > 8 hours           Airway   Mallampati: II  TM distance: >3 FB  Neck ROM: full  No difficulty expected  Dental    (+) poor dentition    Pulmonary - normal exam   (+) a smoker Former, COPD mild, shortness of breath, recent URI,   Cardiovascular - normal exam  Exercise tolerance: good (4-7 METS)    NYHA Classification: II    (+) hypertension, CAD,       Neuro/Psych  (+) psychiatric history Anxiety,     GI/Hepatic/Renal/Endo    (+)  GERD well controlled,      Musculoskeletal     (+) back pain,   Abdominal    Substance History      OB/GYN          Other                        Anesthesia Plan    ASA 3     general     intravenous induction   Anesthetic plan, all risks, benefits, and alternatives have been provided, discussed and informed consent has been obtained with: patient.

## 2018-11-27 NOTE — DISCHARGE INSTRUCTIONS
[address] [place] [address]*PHONE [phone]*FAX [phone]  Som Redd MD.  NASAL SALINE IRRIGATION  The nose should be irrigated 1 hour before bedtime using the Fernie Med Sinus Rinse Kit and distilled water. Distilled water can be purchased at [facility] for about 69 cents per gallon. For the first 2 (two) weeks, administer the isotonic lavage every night. After the initial 4 (four) weeks of use, we suggest you continue lavaging 2-3 (two to three) times per week to keep the sinuses clean. Think of it as the “dental floss” for your sinuses!  Stand over the sink and gently squirt the solution into the nose in such a fashion that you are able to spit some of the saline out of your mouth. DO NOT SWALLOW THE SOLUTION. This suggests that you are doing an adequate job of irrigating the entire nose.  Aim the solution as though you are trying to squirt the back of your head, NOT the top of your head. Use 4 (four) ounces in the left nostril and 4 (four) ounces in the right nostril. Do not force the solution into the nose. Gently squeeze with a fair amount of pressure. Tepid (warm) solution is preferred as it is much more comfortable to the sinus lining. After lavaging, gently blow your nose.     The benefits of isotonic saline irrigation are three-fold:  1. It is a solvent. It cleans mucous, crusts and other debris from the nasal passages.  2. It decongests the nose. Because of the salt concentration, fluid is pulled out of the nasal mucosa membranes. This shrinks the membranes which improves nasal air flow and opens sinus passages.   3. It improves nasal drainage. Studies have shown that saltwater cleansing of the nasal membranes improves cilary beating so that normal mucous is transported better from the sinuses through the nose and into the throat.    If you are using an intranasal steroid, such as Beconase, Vancenase, Flonase, Rhinocort, or Nasocort, you should always cleanse the  nose first with the [facility]ie Med solution before utilizing the intranasal steroid. The intranasal steroid is most effective when sprayed onto clean nasal membranes because it reaches deeper into the nose after cleansing. Use your intranasal steroid right before bedtime, at least 40-60 minutes after lavaging.     CAUTION: Patients on a restricted sodium (common salt) diet due to heart conditions should be especially careful not to swallow the solution during and after the rinses.   If you have a sinus CT scan scheduled, discontinue irrigation 3(three) days before the test!  If you do not understand any portion of your pre-operative instructions, please feel free to contact our office for assistance. Thank you!        No nose blowing  No Lifting bending straining for 1 week  Call if temperature over 100.5 or severe unrelenting pain or vision difficulty

## 2018-11-27 NOTE — ANESTHESIA POSTPROCEDURE EVALUATION
Patient: Julia Gay    Procedure Summary     Date:  11/27/18 Room / Location:  [facility] OR 08 / [facility] OR    Anesthesia Start:  0805 Anesthesia Stop:  1004    Procedure:  NASAL SEPTAL REPAIR, BILATERAL PARTIAL INFERIOR TURBINECTOMIES, BILATERAL FRONTAL ENDOSCOPIC SINUS SURGERY, RIGHT ENDOSCOPIC ETHMOID SINUS SURGERY, RIGHT ENDOSCOPIC BILATERAL SINUS SURGERY (Bilateral Nose) Diagnosis:       Chronic ethmoidal sinusitis      Chronic maxillary sinusitis      Chronic frontal sinusitis      Nasal septal deviation      Nasal turbinate hypertrophy      Nasal obstruction      (Chronic ethmoidal sinusitis [J32.2])      (Chronic maxillary sinusitis [J32.0])      (Chronic frontal sinusitis [J32.1])      (Nasal septal deviation [J34.2])      (Nasal turbinate hypertrophy [J34.3])      (Nasal obstruction [J34.89])    Surgeon:  Som Redd MD Provider:  Osmin Negrete MD    Anesthesia Type:  general ASA Status:  3          Anesthesia Type: general  Last vitals  BP   129/76 (11/27/18 0705)   Temp   98.1 °F (36.7 °C) (11/27/18 0705)   Pulse   98 (11/27/18 0705)   Resp   20 (11/27/18 0705)     SpO2   97 % (11/27/18 0705)     Post Anesthesia Care and Evaluation    Patient location during evaluation: PACU  Patient participation: complete - patient participated  Level of consciousness: sleepy but conscious  Pain management: adequate  Airway patency: patent  Anesthetic complications: No anesthetic complications    Cardiovascular status: acceptable  Respiratory status: acceptable  Hydration status: acceptable

## 2018-11-27 NOTE — OP NOTE
OPERATIVE NOTE    Name:    Julia Gay  YOB: 1970  Date of surgery:   11/27/2018    Pre-op Diagnosis:   Chronic ethmoidal sinusitis [J32.2]  Chronic maxillary sinusitis [J32.0]  Chronic frontal sinusitis [J32.1]  Nasal septal deviation [J34.2]  Nasal turbinate hypertrophy [J34.3]  Nasal obstruction [J34.89]    Post-op Diagnosis:    Post-Op Diagnosis Codes:     * Chronic ethmoidal sinusitis [J32.2]     * Chronic maxillary sinusitis [J32.0]     * Chronic frontal sinusitis [J32.1]     * Nasal septal deviation [J34.2]     * Nasal turbinate hypertrophy [J34.3]     * Nasal obstruction [J34.89]    Procedure:  Procedure(s):  NASAL SEPTAL REPAIR, BILATERAL PARTIAL INFERIOR TURBINECTOMIES, BILATERAL FRONTAL ENDOSCOPIC SINUS SURGERY, RIGHT ENDOSCOPIC ETHMOID SINUS SURGERY, RIGHT      Surgeon:  Som Redd MD, AAOHNS    Anesthesia: Generala total of 20 mL    Staff:   Circulator: Gilma Bianchi RN  Scrub Person: Marisela Clifton Bridgett  Assistant: Zoila Mcfadden CSA    Estimated Blood Loss:    Specimens:  15 ML  ID Type Source Tests Collected by Time   A : RIGHT SINUS  Tissue Naris, Right TISSUE PATHOLOGY EXAM Som Redd MD 11/27/2018 0844   B : LEFT SINUS  Tissue Naris, Right TISSUE PATHOLOGY EXAM Som Redd MD 11/27/2018 0919   C : SEPTUM Tissue Nose TISSUE PATHOLOGY EXAM Som Redd MD 11/27/2018 0946         Drains:  None,internal splints were placed    Findings:    Severe septal deformity polypoid tissue maxillary ethmoid and frontal sinus on the right only severe distortion of septum and external nose some saddling prior to surgery.    .No evidence of CSF or orbital fat or pupil changes    Complications: None    IMPLANTS:   Nothing was implanted during the procedure    INDICATIONS:L medical therapy persistent sinusitis based on CT and physical findings.  The risks benefits complications recovery were discussed and patient chose surgical  approaches to do further medical therapy Roman limitations that there is no guarantee wouldn't improve his airway or be able to remove all disease inter internally and we could require secondary surgeries particularly frontal sinuses if sinuses do not clear 25% chance that post operatively    PROCEDURE:  Patient was taken to the operating room and placed in supine position.  The CT scan was reviewed.  The timeout was reviewed done.  The nose was then injected with local anesthetic septum turbinates and ostiomeatal unit.  The Afrin pledgets were placed in the nose.  Patient was then prepped and draped.  All the packing was removed.  Please note at the end of procedure all the counts were correct.    The nasal septum was then approached through a hemitransfixion incision on the left elevation mucosal syndrome both sides anteriorly because the caudal septum sticking outside the normal position mucoperichondrial periosteal flaps were elevated posteriorly in the bony cartilaginous junction  in small amount of bone removed.  Very little cartilage was removed on the right.  Later chisel was used to fraction the bone where the spurwas so far the right and could not be corrected B is transseptal approach.  No mucosal tears were noted on opposing sides.  Later coapted sutures were placed through and through the septum.  Tensions and taken to the inferior turbinates are outfractured then utilizing the microdebrider intramurally and then   around the inferior aspect bone was soft tissue was removed.      And cauterized.The left frontal sinus approach by removing some ethmoid cells with use of 30° scope the microdebrider And forceps until was opened and seen to be clear and there is no purulence on the left.  Changes take the right side worked utilizing a 30° scope and later that 70° scope maxillary ostium was opened thick mucoid secretions were found polypoid tissue which was opened and removed widely.  This was  done with sickle knife microdebrider multiple forceps ethmoid cells weren't open anterior posterior most of them are involved with polypoid tissue.  No evidence of orbital fat or CSF seen in the frontal sinus was approached and found by transillumination and use of the frontal sinus seeker.  The polypoid tissue removed and much possible and purulent material was irrigated.  Since open anteriorly as well as possible.  Later Gelfoam stents were placed in the area.  This and the nasal stents were placed to coapt the septum more appropriate given support because its tendency to want to go backwards into each original position.  Care was taken to provide adequate support to the cartilage.  Patient was then transferred to the recovery room in stable condition instructions were given the family with all counts correct ,no pupillary changes were noted there was no unusual bleeding at the end of procedure.           This document has been electronically signed by Som Redd MD on November 27, 2018 10:00 AM

## 2018-11-28 LAB
LAB AP CASE REPORT: NORMAL
PATH REPORT.FINAL DX SPEC: NORMAL
PATH REPORT.GROSS SPEC: NORMAL

## 2018-11-29 ENCOUNTER — TELEPHONE (OUTPATIENT)
Dept: OTOLARYNGOLOGY | Facility: CLINIC | Age: 48
End: 2018-11-29

## 2018-11-29 RX ORDER — FLUTICASONE PROPIONATE 50 MCG
SPRAY, SUSPENSION (ML) NASAL
Qty: 16 ML | Refills: 0 | Status: ON HOLD | OUTPATIENT
Start: 2018-11-29 | End: 2018-12-21

## 2018-11-29 RX ORDER — CITALOPRAM 40 MG/1
40 TABLET ORAL DAILY
Qty: 30 TABLET | Refills: 0 | Status: SHIPPED | OUTPATIENT
Start: 2018-11-29 | End: 2018-12-26 | Stop reason: SDUPTHER

## 2018-11-29 NOTE — TELEPHONE ENCOUNTER
Per Dr. Redd, I notified patients wife and told her that he could start taking the pain medicine he has from his pain doctor.

## 2018-12-03 ENCOUNTER — OFFICE VISIT (OUTPATIENT)
Dept: OTOLARYNGOLOGY | Facility: CLINIC | Age: 48
End: 2018-12-03

## 2018-12-03 VITALS — HEIGHT: 68 IN | WEIGHT: 153 LBS | TEMPERATURE: 96.9 F | BODY MASS INDEX: 23.19 KG/M2

## 2018-12-03 DIAGNOSIS — J32.2 CHRONIC ETHMOIDAL SINUSITIS: Primary | ICD-10-CM

## 2018-12-03 PROCEDURE — 99024 POSTOP FOLLOW-UP VISIT: CPT | Performed by: OTOLARYNGOLOGY

## 2018-12-03 RX ORDER — OXYCODONE HYDROCHLORIDE AND ACETAMINOPHEN 5; 325 MG/1; MG/1
1 TABLET ORAL EVERY 6 HOURS PRN
Qty: 15 TABLET | Refills: 0 | OUTPATIENT
Start: 2018-12-03 | End: 2018-12-21 | Stop reason: HOSPADM

## 2018-12-03 RX ORDER — HYDROCODONE BITARTRATE AND ACETAMINOPHEN 10; 325 MG/1; MG/1
1 TABLET ORAL EVERY 6 HOURS PRN
COMMUNITY
End: 2018-12-27

## 2018-12-03 NOTE — PATIENT INSTRUCTIONS

## 2018-12-03 NOTE — PROGRESS NOTES
He comes back in follow-up sinus tenderness and pain discomfort his surgery is extensive on the septum and have splints.  His not had any fever chills said he's been rinsing his nose carefully.       Final Diagnosis   1.  RIGHT FRONTAL SINUS CONTENTS, CURETTAGE:  CHRONIC INFLAMMATION.     2.  LEFT FRONTAL SINUS CONTENTS, CURETTAGE:  CHRONIC INFLAMMATION.     3.  NASAL SEPTUM, PORTIONS OF:  NO SIGNIFICANT HISTOLOGIC ABNORMALITY.   Electronically         Procedure note note endoscopically sutures were cut and the stents were removed.  And then the maxillary ostium and ethmoid area was evaluated in the right and some of the gel film was removed endoscopically along with the crust systemic use the 30° endoscope and suction and forceps.  Tensions take the op side again and the frontal recess this area was debrided.  He tolerated it well there is no evidence of bleeding no evidence of orbital fat no evidence of complication.  He had minimal bleeding.      Asked him to continue irrigation finish his antibiotics and given 2 more additional pain medicine told to use it long-term warned about drowsiness recheck in 2 weeks he is to call for questions or problems will consider repeat CT if not improving the told her to possibly may need external approach the frontal sinuses if not clearing.  DANYELLE Redd MD

## 2018-12-21 ENCOUNTER — OFFICE VISIT (OUTPATIENT)
Dept: OTOLARYNGOLOGY | Facility: CLINIC | Age: 48
End: 2018-12-21

## 2018-12-21 ENCOUNTER — HOSPITAL ENCOUNTER (OUTPATIENT)
Facility: HOSPITAL | Age: 48
Discharge: HOME | End: 2018-12-22
Attending: OTOLARYNGOLOGY | Admitting: OTOLARYNGOLOGY

## 2018-12-21 ENCOUNTER — DOCUMENTATION (OUTPATIENT)
Dept: OTOLARYNGOLOGY | Facility: CLINIC | Age: 48
End: 2018-12-21

## 2018-12-21 ENCOUNTER — APPOINTMENT (OUTPATIENT)
Dept: GENERAL RADIOLOGY | Facility: HOSPITAL | Age: 48
End: 2018-12-21

## 2018-12-21 ENCOUNTER — ANESTHESIA EVENT (OUTPATIENT)
Dept: PERIOP | Facility: HOSPITAL | Age: 48
End: 2018-12-21

## 2018-12-21 ENCOUNTER — HOSPITAL ENCOUNTER (EMERGENCY)
Facility: HOSPITAL | Age: 48
Discharge: HOME | End: 2018-12-21
Attending: FAMILY MEDICINE | Admitting: FAMILY MEDICINE

## 2018-12-21 ENCOUNTER — PREP FOR SURGERY (OUTPATIENT)
Dept: OTHER | Facility: HOSPITAL | Age: 48
End: 2018-12-21

## 2018-12-21 ENCOUNTER — ANESTHESIA (OUTPATIENT)
Dept: PERIOP | Facility: HOSPITAL | Age: 48
End: 2018-12-21

## 2018-12-21 VITALS
SYSTOLIC BLOOD PRESSURE: 112 MMHG | OXYGEN SATURATION: 98 % | HEART RATE: 80 BPM | RESPIRATION RATE: 18 BRPM | BODY MASS INDEX: 23.19 KG/M2 | DIASTOLIC BLOOD PRESSURE: 58 MMHG | WEIGHT: 153 LBS | TEMPERATURE: 98.6 F | HEIGHT: 68 IN

## 2018-12-21 VITALS
WEIGHT: 153 LBS | DIASTOLIC BLOOD PRESSURE: 72 MMHG | SYSTOLIC BLOOD PRESSURE: 102 MMHG | HEIGHT: 68 IN | BODY MASS INDEX: 23.19 KG/M2

## 2018-12-21 DIAGNOSIS — D62 ANEMIA DUE TO ACUTE BLOOD LOSS: ICD-10-CM

## 2018-12-21 DIAGNOSIS — Z79.01 ON ANTICOAGULANT THERAPY: Primary | ICD-10-CM

## 2018-12-21 DIAGNOSIS — R04.0 EPISTAXIS: Primary | ICD-10-CM

## 2018-12-21 DIAGNOSIS — R04.0 ACUTE POSTERIOR EPISTAXIS: ICD-10-CM

## 2018-12-21 LAB
ANION GAP SERPL CALCULATED.3IONS-SCNC: 6 MMOL/L (ref 5–15)
BASOPHILS # BLD AUTO: 0.02 10*3/MM3 (ref 0–0.2)
BASOPHILS # BLD AUTO: 0.02 10*3/MM3 (ref 0–0.2)
BASOPHILS NFR BLD AUTO: 0.2 % (ref 0–2)
BASOPHILS NFR BLD AUTO: 0.2 % (ref 0–2)
BUN BLD-MCNC: 33 MG/DL (ref 7–21)
BUN/CREAT SERPL: 46.5 (ref 7–25)
CALCIUM SPEC-SCNC: 8.3 MG/DL (ref 8.4–10.2)
CHLORIDE SERPL-SCNC: 106 MMOL/L (ref 95–110)
CO2 SERPL-SCNC: 24 MMOL/L (ref 22–31)
CREAT BLD-MCNC: 0.71 MG/DL (ref 0.7–1.3)
DEPRECATED RDW RBC AUTO: 43.5 FL (ref 35.1–43.9)
DEPRECATED RDW RBC AUTO: 43.7 FL (ref 35.1–43.9)
DEPRECATED RDW RBC AUTO: 43.8 FL (ref 35.1–43.9)
EOSINOPHIL # BLD AUTO: 0.04 10*3/MM3 (ref 0–0.7)
EOSINOPHIL # BLD AUTO: 0.08 10*3/MM3 (ref 0–0.7)
EOSINOPHIL NFR BLD AUTO: 0.4 % (ref 0–7)
EOSINOPHIL NFR BLD AUTO: 0.7 % (ref 0–7)
ERYTHROCYTE [DISTWIDTH] IN BLOOD BY AUTOMATED COUNT: 12.9 % (ref 11.5–14.5)
ERYTHROCYTE [DISTWIDTH] IN BLOOD BY AUTOMATED COUNT: 13 % (ref 11.5–14.5)
ERYTHROCYTE [DISTWIDTH] IN BLOOD BY AUTOMATED COUNT: 13 % (ref 11.5–14.5)
GFR SERPL CREATININE-BSD FRML MDRD: 117 ML/MIN/1.73 (ref 56–130)
GLUCOSE BLD-MCNC: 112 MG/DL (ref 60–100)
HCT VFR BLD AUTO: 29.9 % (ref 39–49)
HCT VFR BLD AUTO: 29.9 % (ref 39–49)
HCT VFR BLD AUTO: 34.6 % (ref 39–49)
HCT VFR BLD AUTO: 36.3 % (ref 39–49)
HGB BLD-MCNC: 10.2 G/DL (ref 13.7–17.3)
HGB BLD-MCNC: 10.5 G/DL (ref 13.7–17.3)
HGB BLD-MCNC: 12 G/DL (ref 13.7–17.3)
HGB BLD-MCNC: 12.4 G/DL (ref 13.7–17.3)
IMM GRANULOCYTES # BLD AUTO: 0.02 10*3/MM3 (ref 0–0.02)
IMM GRANULOCYTES # BLD AUTO: 0.04 10*3/MM3 (ref 0–0.02)
IMM GRANULOCYTES NFR BLD AUTO: 0.2 % (ref 0–0.5)
IMM GRANULOCYTES NFR BLD AUTO: 0.3 % (ref 0–0.5)
LYMPHOCYTES # BLD AUTO: 1.58 10*3/MM3 (ref 0.6–4.2)
LYMPHOCYTES # BLD AUTO: 1.6 10*3/MM3 (ref 0.6–4.2)
LYMPHOCYTES NFR BLD AUTO: 13.5 % (ref 10–50)
LYMPHOCYTES NFR BLD AUTO: 15.5 % (ref 10–50)
MCH RBC QN AUTO: 31.3 PG (ref 26.5–34)
MCH RBC QN AUTO: 31.6 PG (ref 26.5–34)
MCH RBC QN AUTO: 31.9 PG (ref 26.5–34)
MCHC RBC AUTO-ENTMCNC: 34.1 G/DL (ref 31.5–36.3)
MCHC RBC AUTO-ENTMCNC: 34.2 G/DL (ref 31.5–36.3)
MCHC RBC AUTO-ENTMCNC: 34.7 G/DL (ref 31.5–36.3)
MCV RBC AUTO: 91.7 FL (ref 80–98)
MCV RBC AUTO: 92 FL (ref 80–98)
MCV RBC AUTO: 92.6 FL (ref 80–98)
MONOCYTES # BLD AUTO: 0.63 10*3/MM3 (ref 0–0.9)
MONOCYTES # BLD AUTO: 0.7 10*3/MM3 (ref 0–0.9)
MONOCYTES NFR BLD AUTO: 6 % (ref 0–12)
MONOCYTES NFR BLD AUTO: 6.1 % (ref 0–12)
NEUTROPHILS # BLD AUTO: 7.98 10*3/MM3 (ref 2–8.6)
NEUTROPHILS # BLD AUTO: 9.31 10*3/MM3 (ref 2–8.6)
NEUTROPHILS NFR BLD AUTO: 77.6 % (ref 37–80)
NEUTROPHILS NFR BLD AUTO: 79.3 % (ref 37–80)
NRBC BLD AUTO-RTO: 0 /100 WBC (ref 0–0)
PLATELET # BLD AUTO: 206 10*3/MM3 (ref 150–450)
PLATELET # BLD AUTO: 213 10*3/MM3 (ref 150–450)
PLATELET # BLD AUTO: 237 10*3/MM3 (ref 150–450)
PMV BLD AUTO: 10.1 FL (ref 8–12)
PMV BLD AUTO: 10.3 FL (ref 8–12)
PMV BLD AUTO: 10.4 FL (ref 8–12)
POTASSIUM BLD-SCNC: 4.1 MMOL/L (ref 3.5–5.1)
RBC # BLD AUTO: 3.26 10*6/MM3 (ref 4.37–5.74)
RBC # BLD AUTO: 3.76 10*6/MM3 (ref 4.37–5.74)
RBC # BLD AUTO: 3.92 10*6/MM3 (ref 4.37–5.74)
SODIUM BLD-SCNC: 136 MMOL/L (ref 137–145)
WBC NRBC COR # BLD: 10.29 10*3/MM3 (ref 3.2–9.8)
WBC NRBC COR # BLD: 11.73 10*3/MM3 (ref 3.2–9.8)
WBC NRBC COR # BLD: 9.84 10*3/MM3 (ref 3.2–9.8)

## 2018-12-21 PROCEDURE — 99024 POSTOP FOLLOW-UP VISIT: CPT | Performed by: OTOLARYNGOLOGY

## 2018-12-21 PROCEDURE — 30903 CONTROL OF NOSEBLEED: CPT | Performed by: OTOLARYNGOLOGY

## 2018-12-21 PROCEDURE — 85018 HEMOGLOBIN: CPT | Performed by: FAMILY MEDICINE

## 2018-12-21 PROCEDURE — A9270 NON-COVERED ITEM OR SERVICE: HCPCS | Performed by: OTOLARYNGOLOGY

## 2018-12-21 PROCEDURE — 63710000001 HYDROCODONE-ACETAMINOPHEN 10-325 MG TABLET: Performed by: OTOLARYNGOLOGY

## 2018-12-21 PROCEDURE — 85014 HEMATOCRIT: CPT | Performed by: FAMILY MEDICINE

## 2018-12-21 PROCEDURE — 93005 ELECTROCARDIOGRAM TRACING: CPT | Performed by: FAMILY MEDICINE

## 2018-12-21 PROCEDURE — 85025 COMPLETE CBC W/AUTO DIFF WBC: CPT | Performed by: FAMILY MEDICINE

## 2018-12-21 PROCEDURE — 99283 EMERGENCY DEPT VISIT LOW MDM: CPT | Performed by: OTOLARYNGOLOGY

## 2018-12-21 PROCEDURE — 25010000002 MIDAZOLAM PER 1 MG: Performed by: NURSE ANESTHETIST, CERTIFIED REGISTERED

## 2018-12-21 PROCEDURE — 25010000002 PHENYLEPHRINE PER 1 ML: Performed by: NURSE ANESTHETIST, CERTIFIED REGISTERED

## 2018-12-21 PROCEDURE — 25010000002 ONDANSETRON PER 1 MG: Performed by: NURSE ANESTHETIST, CERTIFIED REGISTERED

## 2018-12-21 PROCEDURE — 80048 BASIC METABOLIC PNL TOTAL CA: CPT | Performed by: FAMILY MEDICINE

## 2018-12-21 PROCEDURE — 25010000002 DEXAMETHASONE PER 1 MG: Performed by: NURSE ANESTHETIST, CERTIFIED REGISTERED

## 2018-12-21 PROCEDURE — 63710000001 ATORVASTATIN 20 MG TABLET: Performed by: OTOLARYNGOLOGY

## 2018-12-21 PROCEDURE — 25010000002 FENTANYL CITRATE (PF) 100 MCG/2ML SOLUTION: Performed by: NURSE ANESTHETIST, CERTIFIED REGISTERED

## 2018-12-21 PROCEDURE — 85027 COMPLETE CBC AUTOMATED: CPT | Performed by: ANESTHESIOLOGY

## 2018-12-21 PROCEDURE — 93010 ELECTROCARDIOGRAM REPORT: CPT | Performed by: INTERNAL MEDICINE

## 2018-12-21 PROCEDURE — 63710000001 RANOLAZINE 500 MG TABLET SUSTAINED-RELEASE 12 HOUR: Performed by: OTOLARYNGOLOGY

## 2018-12-21 PROCEDURE — 25010000002 PROPOFOL 10 MG/ML EMULSION: Performed by: NURSE ANESTHETIST, CERTIFIED REGISTERED

## 2018-12-21 PROCEDURE — 71045 X-RAY EXAM CHEST 1 VIEW: CPT

## 2018-12-21 PROCEDURE — 99284 EMERGENCY DEPT VISIT MOD MDM: CPT

## 2018-12-21 PROCEDURE — 25010000002 SUCCINYLCHOLINE PER 20 MG: Performed by: NURSE ANESTHETIST, CERTIFIED REGISTERED

## 2018-12-21 RX ORDER — ACETAMINOPHEN 325 MG/1
650 TABLET ORAL ONCE AS NEEDED
Status: DISCONTINUED | OUTPATIENT
Start: 2018-12-21 | End: 2018-12-21 | Stop reason: HOSPADM

## 2018-12-21 RX ORDER — MIDAZOLAM HYDROCHLORIDE 1 MG/ML
INJECTION INTRAMUSCULAR; INTRAVENOUS AS NEEDED
Status: DISCONTINUED | OUTPATIENT
Start: 2018-12-21 | End: 2018-12-21 | Stop reason: SURG

## 2018-12-21 RX ORDER — DEXTROSE, SODIUM CHLORIDE, AND POTASSIUM CHLORIDE 5; .45; .15 G/100ML; G/100ML; G/100ML
80 INJECTION INTRAVENOUS CONTINUOUS
Status: DISCONTINUED | OUTPATIENT
Start: 2018-12-21 | End: 2018-12-22 | Stop reason: HOSPADM

## 2018-12-21 RX ORDER — HYDROCODONE BITARTRATE AND ACETAMINOPHEN 5; 325 MG/1; MG/1
1 TABLET ORAL EVERY 4 HOURS PRN
Status: DISCONTINUED | OUTPATIENT
Start: 2018-12-21 | End: 2018-12-22 | Stop reason: HOSPADM

## 2018-12-21 RX ORDER — CITALOPRAM 40 MG/1
40 TABLET ORAL DAILY
Status: DISCONTINUED | OUTPATIENT
Start: 2018-12-22 | End: 2018-12-22 | Stop reason: HOSPADM

## 2018-12-21 RX ORDER — CLONIDINE HYDROCHLORIDE 0.2 MG/1
0.2 TABLET ORAL ONCE
Status: COMPLETED | OUTPATIENT
Start: 2018-12-21 | End: 2018-12-21

## 2018-12-21 RX ORDER — DEXAMETHASONE SODIUM PHOSPHATE 4 MG/ML
INJECTION, SOLUTION INTRA-ARTICULAR; INTRALESIONAL; INTRAMUSCULAR; INTRAVENOUS; SOFT TISSUE AS NEEDED
Status: DISCONTINUED | OUTPATIENT
Start: 2018-12-21 | End: 2018-12-21 | Stop reason: SURG

## 2018-12-21 RX ORDER — PANTOPRAZOLE SODIUM 40 MG/1
40 TABLET, DELAYED RELEASE ORAL EVERY MORNING
Status: DISCONTINUED | OUTPATIENT
Start: 2018-12-22 | End: 2018-12-22 | Stop reason: HOSPADM

## 2018-12-21 RX ORDER — GABAPENTIN 300 MG/1
300 CAPSULE ORAL DAILY
Status: DISCONTINUED | OUTPATIENT
Start: 2018-12-22 | End: 2018-12-22 | Stop reason: HOSPADM

## 2018-12-21 RX ORDER — FENTANYL CITRATE 50 UG/ML
INJECTION, SOLUTION INTRAMUSCULAR; INTRAVENOUS AS NEEDED
Status: DISCONTINUED | OUTPATIENT
Start: 2018-12-21 | End: 2018-12-21

## 2018-12-21 RX ORDER — ASPIRIN 325 MG
TABLET ORAL DAILY
Status: ON HOLD | COMMUNITY
End: 2018-12-21

## 2018-12-21 RX ORDER — ROCURONIUM BROMIDE 10 MG/ML
INJECTION, SOLUTION INTRAVENOUS AS NEEDED
Status: DISCONTINUED | OUTPATIENT
Start: 2018-12-21 | End: 2018-12-21 | Stop reason: SURG

## 2018-12-21 RX ORDER — ALBUTEROL SULFATE 2.5 MG/3ML
2.5 SOLUTION RESPIRATORY (INHALATION) EVERY 4 HOURS PRN
Status: DISCONTINUED | OUTPATIENT
Start: 2018-12-21 | End: 2018-12-22 | Stop reason: HOSPADM

## 2018-12-21 RX ORDER — DIPHENHYDRAMINE HYDROCHLORIDE 50 MG/ML
12.5 INJECTION INTRAMUSCULAR; INTRAVENOUS
Status: DISCONTINUED | OUTPATIENT
Start: 2018-12-21 | End: 2018-12-21 | Stop reason: HOSPADM

## 2018-12-21 RX ORDER — ACETAMINOPHEN 650 MG/1
650 SUPPOSITORY RECTAL ONCE AS NEEDED
Status: DISCONTINUED | OUTPATIENT
Start: 2018-12-21 | End: 2018-12-21 | Stop reason: HOSPADM

## 2018-12-21 RX ORDER — DIPHENHYDRAMINE HCL 25 MG
25 CAPSULE ORAL EVERY 6 HOURS PRN
Status: DISCONTINUED | OUTPATIENT
Start: 2018-12-21 | End: 2018-12-22 | Stop reason: HOSPADM

## 2018-12-21 RX ORDER — ONDANSETRON 2 MG/ML
INJECTION INTRAMUSCULAR; INTRAVENOUS AS NEEDED
Status: DISCONTINUED | OUTPATIENT
Start: 2018-12-21 | End: 2018-12-21 | Stop reason: SURG

## 2018-12-21 RX ORDER — FENTANYL CITRATE 50 UG/ML
INJECTION, SOLUTION INTRAMUSCULAR; INTRAVENOUS AS NEEDED
Status: DISCONTINUED | OUTPATIENT
Start: 2018-12-21 | End: 2018-12-21 | Stop reason: SURG

## 2018-12-21 RX ORDER — CLINDAMYCIN PHOSPHATE 900 MG/50ML
900 INJECTION INTRAVENOUS EVERY 8 HOURS
Status: COMPLETED | OUTPATIENT
Start: 2018-12-21 | End: 2018-12-22

## 2018-12-21 RX ORDER — HYDROCODONE BITARTRATE AND ACETAMINOPHEN 10; 325 MG/1; MG/1
1 TABLET ORAL EVERY 6 HOURS PRN
Status: DISCONTINUED | OUTPATIENT
Start: 2018-12-21 | End: 2018-12-22

## 2018-12-21 RX ORDER — RANOLAZINE 500 MG/1
500 TABLET, EXTENDED RELEASE ORAL EVERY 12 HOURS SCHEDULED
Status: DISCONTINUED | OUTPATIENT
Start: 2018-12-21 | End: 2018-12-22 | Stop reason: HOSPADM

## 2018-12-21 RX ORDER — NALOXONE HCL 0.4 MG/ML
0.2 VIAL (ML) INJECTION AS NEEDED
Status: DISCONTINUED | OUTPATIENT
Start: 2018-12-21 | End: 2018-12-21 | Stop reason: HOSPADM

## 2018-12-21 RX ORDER — ATORVASTATIN CALCIUM 20 MG/1
20 TABLET, FILM COATED ORAL NIGHTLY
Status: DISCONTINUED | OUTPATIENT
Start: 2018-12-21 | End: 2018-12-22 | Stop reason: HOSPADM

## 2018-12-21 RX ORDER — NITROGLYCERIN 0.4 MG/1
0.4 TABLET SUBLINGUAL AS NEEDED
Status: DISCONTINUED | OUTPATIENT
Start: 2018-12-21 | End: 2018-12-22 | Stop reason: HOSPADM

## 2018-12-21 RX ORDER — SODIUM CHLORIDE 0.9 % (FLUSH) 0.9 %
3-10 SYRINGE (ML) INJECTION AS NEEDED
Status: DISCONTINUED | OUTPATIENT
Start: 2018-12-21 | End: 2018-12-22 | Stop reason: HOSPADM

## 2018-12-21 RX ORDER — CYCLOBENZAPRINE HCL 5 MG
5 TABLET ORAL DAILY
Status: DISCONTINUED | OUTPATIENT
Start: 2018-12-22 | End: 2018-12-22 | Stop reason: HOSPADM

## 2018-12-21 RX ORDER — PROPOFOL 10 MG/ML
VIAL (ML) INTRAVENOUS AS NEEDED
Status: DISCONTINUED | OUTPATIENT
Start: 2018-12-21 | End: 2018-12-21 | Stop reason: SURG

## 2018-12-21 RX ORDER — CARVEDILOL 3.12 MG/1
3.12 TABLET ORAL DAILY
Status: DISCONTINUED | OUTPATIENT
Start: 2018-12-22 | End: 2018-12-22

## 2018-12-21 RX ORDER — SUCCINYLCHOLINE CHLORIDE 20 MG/ML
INJECTION INTRAMUSCULAR; INTRAVENOUS AS NEEDED
Status: DISCONTINUED | OUTPATIENT
Start: 2018-12-21 | End: 2018-12-21 | Stop reason: SURG

## 2018-12-21 RX ORDER — FLUTICASONE PROPIONATE 50 MCG
2 SPRAY, SUSPENSION (ML) NASAL DAILY
Status: ON HOLD | COMMUNITY
End: 2018-12-21

## 2018-12-21 RX ORDER — OXYMETAZOLINE HYDROCHLORIDE 0.05 G/100ML
SPRAY NASAL AS NEEDED
Status: DISCONTINUED | OUTPATIENT
Start: 2018-12-21 | End: 2018-12-22 | Stop reason: HOSPADM

## 2018-12-21 RX ORDER — SODIUM CHLORIDE 9 MG/ML
1000 INJECTION, SOLUTION INTRAVENOUS CONTINUOUS
Status: DISCONTINUED | OUTPATIENT
Start: 2018-12-21 | End: 2018-12-22 | Stop reason: HOSPADM

## 2018-12-21 RX ORDER — SODIUM CHLORIDE 0.9 % (FLUSH) 0.9 %
3 SYRINGE (ML) INJECTION EVERY 12 HOURS SCHEDULED
Status: DISCONTINUED | OUTPATIENT
Start: 2018-12-21 | End: 2018-12-22 | Stop reason: HOSPADM

## 2018-12-21 RX ORDER — HYDROMORPHONE HYDROCHLORIDE 2 MG/1
2 TABLET ORAL
Status: DISCONTINUED | OUTPATIENT
Start: 2018-12-21 | End: 2018-12-22 | Stop reason: HOSPADM

## 2018-12-21 RX ORDER — ONDANSETRON 2 MG/ML
4 INJECTION INTRAMUSCULAR; INTRAVENOUS ONCE AS NEEDED
Status: DISCONTINUED | OUTPATIENT
Start: 2018-12-21 | End: 2018-12-22 | Stop reason: HOSPADM

## 2018-12-21 RX ORDER — LABETALOL HYDROCHLORIDE 5 MG/ML
5 INJECTION, SOLUTION INTRAVENOUS
Status: DISCONTINUED | OUTPATIENT
Start: 2018-12-21 | End: 2018-12-21 | Stop reason: HOSPADM

## 2018-12-21 RX ORDER — FLUMAZENIL 0.1 MG/ML
0.2 INJECTION INTRAVENOUS AS NEEDED
Status: DISCONTINUED | OUTPATIENT
Start: 2018-12-21 | End: 2018-12-21 | Stop reason: HOSPADM

## 2018-12-21 RX ORDER — EPHEDRINE SULFATE 50 MG/ML
5 INJECTION, SOLUTION INTRAVENOUS ONCE AS NEEDED
Status: DISCONTINUED | OUTPATIENT
Start: 2018-12-21 | End: 2018-12-21 | Stop reason: HOSPADM

## 2018-12-21 RX ORDER — ONDANSETRON 2 MG/ML
4 INJECTION INTRAMUSCULAR; INTRAVENOUS ONCE AS NEEDED
Status: DISCONTINUED | OUTPATIENT
Start: 2018-12-21 | End: 2018-12-21 | Stop reason: HOSPADM

## 2018-12-21 RX ORDER — ACETAMINOPHEN 325 MG/1
650 TABLET ORAL EVERY 4 HOURS PRN
Status: DISCONTINUED | OUTPATIENT
Start: 2018-12-21 | End: 2018-12-22 | Stop reason: HOSPADM

## 2018-12-21 RX ADMIN — ONDANSETRON 4 MG: 2 INJECTION INTRAMUSCULAR; INTRAVENOUS at 18:39

## 2018-12-21 RX ADMIN — DEXAMETHASONE SODIUM PHOSPHATE 4 MG: 4 INJECTION, SOLUTION INTRAMUSCULAR; INTRAVENOUS at 18:39

## 2018-12-21 RX ADMIN — PHENYLEPHRINE HYDROCHLORIDE 100 MCG: 10 INJECTION INTRAVENOUS at 18:20

## 2018-12-21 RX ADMIN — SUCCINYLCHOLINE CHLORIDE 140 MG: 20 INJECTION, SOLUTION INTRAMUSCULAR; INTRAVENOUS at 18:15

## 2018-12-21 RX ADMIN — RANOLAZINE 500 MG: 500 TABLET, FILM COATED, EXTENDED RELEASE ORAL at 21:28

## 2018-12-21 RX ADMIN — PROPOFOL 120 MG: 10 INJECTION, EMULSION INTRAVENOUS at 18:15

## 2018-12-21 RX ADMIN — ROCURONIUM BROMIDE 5 MG: 10 INJECTION INTRAVENOUS at 18:15

## 2018-12-21 RX ADMIN — FENTANYL CITRATE 50 MCG: 50 INJECTION, SOLUTION INTRAMUSCULAR; INTRAVENOUS at 18:15

## 2018-12-21 RX ADMIN — HYDROCODONE BITARTRATE AND ACETAMINOPHEN 1 TABLET: 10; 325 TABLET ORAL at 22:06

## 2018-12-21 RX ADMIN — SODIUM CHLORIDE, PRESERVATIVE FREE 3 ML: 5 INJECTION INTRAVENOUS at 21:28

## 2018-12-21 RX ADMIN — ATORVASTATIN CALCIUM 20 MG: 20 TABLET, FILM COATED ORAL at 21:28

## 2018-12-21 RX ADMIN — PHENYLEPHRINE HYDROCHLORIDE 2 SPRAY: 0.5 SPRAY NASAL at 11:21

## 2018-12-21 RX ADMIN — POTASSIUM CHLORIDE, DEXTROSE MONOHYDRATE AND SODIUM CHLORIDE 80 ML/HR: 150; 5; 450 INJECTION, SOLUTION INTRAVENOUS at 21:29

## 2018-12-21 RX ADMIN — MIDAZOLAM HYDROCHLORIDE 2 MG: 2 INJECTION, SOLUTION INTRAMUSCULAR; INTRAVENOUS at 18:07

## 2018-12-21 RX ADMIN — CLINDAMYCIN IN 5 PERCENT DEXTROSE 900 MG: 18 INJECTION, SOLUTION INTRAVENOUS at 21:28

## 2018-12-21 RX ADMIN — PHENYLEPHRINE HYDROCHLORIDE 100 MCG: 10 INJECTION INTRAVENOUS at 18:25

## 2018-12-21 RX ADMIN — SODIUM CHLORIDE 1000 ML: 9 INJECTION, SOLUTION INTRAVENOUS at 17:30

## 2018-12-21 RX ADMIN — CLONIDINE HYDROCHLORIDE 0.2 MG: 0.2 TABLET ORAL at 11:48

## 2018-12-21 NOTE — ANESTHESIA PREPROCEDURE EVALUATION
Anesthesia Evaluation     Patient summary reviewed   NPO Solid Status: Waived due to emergency  NPO Liquid Status: Waived due to emergency           Airway   Mallampati: II  TM distance: >3 FB  Neck ROM: full  No difficulty expected  Dental    (+) poor dentition    Pulmonary - normal exam   (+) a smoker Former, COPD mild, shortness of breath, recent URI,   Cardiovascular - normal exam  Exercise tolerance: good (4-7 METS)    NYHA Classification: II    (+) hypertension, CAD,       Neuro/Psych  (+) weakness, psychiatric history Anxiety,     GI/Hepatic/Renal/Endo    (+)  GERD well controlled,      Musculoskeletal     (+) back pain,   Abdominal    Substance History      OB/GYN          Other                          Anesthesia Plan    ASA 3 - emergent     general   Rapid sequence(Patient presents with acute epistaxis after recent nasal surgery.  Plan for rsi/cricoid pressure in the or.  Explained risks of aspiraion to patient and wife.  In addition, the patient has taken effient and asa today.)  intravenous induction   Anesthetic plan, all risks, benefits, and alternatives have been provided, discussed and informed consent has been obtained with: patient.

## 2018-12-22 VITALS
DIASTOLIC BLOOD PRESSURE: 80 MMHG | WEIGHT: 153 LBS | RESPIRATION RATE: 18 BRPM | BODY MASS INDEX: 23.19 KG/M2 | HEIGHT: 68 IN | TEMPERATURE: 97.4 F | SYSTOLIC BLOOD PRESSURE: 130 MMHG | HEART RATE: 70 BPM | OXYGEN SATURATION: 96 %

## 2018-12-22 LAB
ANION GAP SERPL CALCULATED.3IONS-SCNC: 7 MMOL/L (ref 5–15)
BASOPHILS # BLD AUTO: 0 10*3/MM3 (ref 0–0.2)
BASOPHILS NFR BLD AUTO: 0 % (ref 0–2)
BUN BLD-MCNC: 22 MG/DL (ref 7–21)
BUN/CREAT SERPL: 31 (ref 7–25)
CALCIUM SPEC-SCNC: 8.5 MG/DL (ref 8.4–10.2)
CHLORIDE SERPL-SCNC: 106 MMOL/L (ref 95–110)
CO2 SERPL-SCNC: 26 MMOL/L (ref 22–31)
CREAT BLD-MCNC: 0.71 MG/DL (ref 0.7–1.3)
DEPRECATED RDW RBC AUTO: 45.6 FL (ref 35.1–43.9)
EOSINOPHIL # BLD AUTO: 0 10*3/MM3 (ref 0–0.7)
EOSINOPHIL NFR BLD AUTO: 0 % (ref 0–7)
ERYTHROCYTE [DISTWIDTH] IN BLOOD BY AUTOMATED COUNT: 13.5 % (ref 11.5–14.5)
GFR SERPL CREATININE-BSD FRML MDRD: 117 ML/MIN/1.73 (ref 56–130)
GLUCOSE BLD-MCNC: 160 MG/DL (ref 60–100)
HCT VFR BLD AUTO: 28.2 % (ref 39–49)
HCT VFR BLD AUTO: 29.2 % (ref 39–49)
HGB BLD-MCNC: 9.8 G/DL (ref 13.7–17.3)
HGB BLD-MCNC: 9.9 G/DL (ref 13.7–17.3)
IMM GRANULOCYTES # BLD AUTO: 0.03 10*3/MM3 (ref 0–0.02)
IMM GRANULOCYTES NFR BLD AUTO: 0.3 % (ref 0–0.5)
LYMPHOCYTES # BLD AUTO: 1.39 10*3/MM3 (ref 0.6–4.2)
LYMPHOCYTES NFR BLD AUTO: 16 % (ref 10–50)
MCH RBC QN AUTO: 31.5 PG (ref 26.5–34)
MCHC RBC AUTO-ENTMCNC: 33.9 G/DL (ref 31.5–36.3)
MCV RBC AUTO: 93 FL (ref 80–98)
MONOCYTES # BLD AUTO: 0.57 10*3/MM3 (ref 0–0.9)
MONOCYTES NFR BLD AUTO: 6.6 % (ref 0–12)
NEUTROPHILS # BLD AUTO: 6.69 10*3/MM3 (ref 2–8.6)
NEUTROPHILS NFR BLD AUTO: 77.1 % (ref 37–80)
NRBC BLD AUTO-RTO: 0 /100 WBC (ref 0–0)
PLATELET # BLD AUTO: 220 10*3/MM3 (ref 150–450)
PMV BLD AUTO: 10.1 FL (ref 8–12)
POTASSIUM BLD-SCNC: 4.1 MMOL/L (ref 3.5–5.1)
RBC # BLD AUTO: 3.14 10*6/MM3 (ref 4.37–5.74)
SODIUM BLD-SCNC: 139 MMOL/L (ref 137–145)
WBC NRBC COR # BLD: 8.68 10*3/MM3 (ref 3.2–9.8)

## 2018-12-22 PROCEDURE — A9270 NON-COVERED ITEM OR SERVICE: HCPCS | Performed by: OTOLARYNGOLOGY

## 2018-12-22 PROCEDURE — 63710000001 PANTOPRAZOLE 40 MG TABLET DELAYED-RELEASE: Performed by: OTOLARYNGOLOGY

## 2018-12-22 PROCEDURE — 63710000001 GABAPENTIN 300 MG CAPSULE: Performed by: OTOLARYNGOLOGY

## 2018-12-22 PROCEDURE — 63710000001 CARVEDILOL 3.125 MG TABLET: Performed by: OTOLARYNGOLOGY

## 2018-12-22 PROCEDURE — 85014 HEMATOCRIT: CPT | Performed by: FAMILY MEDICINE

## 2018-12-22 PROCEDURE — 80048 BASIC METABOLIC PNL TOTAL CA: CPT | Performed by: FAMILY MEDICINE

## 2018-12-22 PROCEDURE — 85025 COMPLETE CBC W/AUTO DIFF WBC: CPT | Performed by: FAMILY MEDICINE

## 2018-12-22 PROCEDURE — 63710000001 HYDROMORPHONE 2 MG TABLET: Performed by: OTOLARYNGOLOGY

## 2018-12-22 PROCEDURE — 63710000001 CYCLOBENZAPRINE 5 MG TABLET: Performed by: OTOLARYNGOLOGY

## 2018-12-22 PROCEDURE — 85018 HEMOGLOBIN: CPT | Performed by: FAMILY MEDICINE

## 2018-12-22 PROCEDURE — 63710000001 HYDROCODONE-ACETAMINOPHEN 10-325 MG TABLET: Performed by: OTOLARYNGOLOGY

## 2018-12-22 RX ORDER — VITAMIN A, VITAMIN C, VITAMIN D-3, VITAMIN E, VITAMIN B-1, VITAMIN B-2, NIACIN, VITAMIN B-6, CALCIUM, IRON, ZINC, COPPER 4000; 120; 400; 22; 1.84; 3; 20; 10; 1; 12; 200; 27; 25; 2 [IU]/1; MG/1; [IU]/1; MG/1; MG/1; MG/1; MG/1; MG/1; MG/1; UG/1; MG/1; MG/1; MG/1; MG/1
1 TABLET ORAL DAILY
Qty: 30 TABLET | Refills: 0 | Status: SHIPPED | OUTPATIENT
Start: 2018-12-22 | End: 2019-05-17

## 2018-12-22 RX ORDER — PRENATAL VIT/IRON FUM/FOLIC AC 27MG-0.8MG
1 TABLET ORAL DAILY
Status: DISCONTINUED | OUTPATIENT
Start: 2018-12-22 | End: 2018-12-22 | Stop reason: HOSPADM

## 2018-12-22 RX ORDER — CLINDAMYCIN HYDROCHLORIDE 150 MG/1
300 CAPSULE ORAL 3 TIMES DAILY
Qty: 40 CAPSULE | Refills: 0 | Status: SHIPPED | OUTPATIENT
Start: 2018-12-22 | End: 2018-12-27

## 2018-12-22 RX ORDER — CLINDAMYCIN HYDROCHLORIDE 150 MG/1
300 CAPSULE ORAL EVERY 8 HOURS SCHEDULED
Status: DISCONTINUED | OUTPATIENT
Start: 2018-12-22 | End: 2018-12-22 | Stop reason: HOSPADM

## 2018-12-22 RX ORDER — FERROUS SULFATE 325(65) MG
325 TABLET ORAL
Qty: 90 TABLET | Refills: 0 | Status: SHIPPED | OUTPATIENT
Start: 2018-12-22 | End: 2019-01-21

## 2018-12-22 RX ORDER — FERROUS SULFATE TAB EC 324 MG (65 MG FE EQUIVALENT) 324 (65 FE) MG
324 TABLET DELAYED RESPONSE ORAL
Status: DISCONTINUED | OUTPATIENT
Start: 2018-12-22 | End: 2018-12-22 | Stop reason: HOSPADM

## 2018-12-22 RX ADMIN — GABAPENTIN 300 MG: 300 CAPSULE ORAL at 09:10

## 2018-12-22 RX ADMIN — HYDROCODONE BITARTRATE AND ACETAMINOPHEN 1 TABLET: 10; 325 TABLET ORAL at 04:07

## 2018-12-22 RX ADMIN — HYDROMORPHONE HYDROCHLORIDE 2 MG: 2 TABLET ORAL at 06:45

## 2018-12-22 RX ADMIN — PANTOPRAZOLE SODIUM 40 MG: 40 TABLET, DELAYED RELEASE ORAL at 05:24

## 2018-12-22 RX ADMIN — CARVEDILOL 3.12 MG: 3.12 TABLET, FILM COATED ORAL at 09:10

## 2018-12-22 RX ADMIN — HYDROMORPHONE HYDROCHLORIDE 2 MG: 2 TABLET ORAL at 12:25

## 2018-12-22 RX ADMIN — CLINDAMYCIN IN 5 PERCENT DEXTROSE 900 MG: 18 INJECTION, SOLUTION INTRAVENOUS at 05:24

## 2018-12-22 RX ADMIN — CYCLOBENZAPRINE HYDROCHLORIDE 5 MG: 5 TABLET, FILM COATED ORAL at 09:09

## 2018-12-22 NOTE — ANESTHESIA PROCEDURE NOTES
ANESTHESIA INTUBATION  Urgency: elective    Airway not difficult    General Information and Staff    Patient location during procedure: OR  CRNA: Vance Freed CRNA    Indications and Patient Condition  Indications for airway management: airway protection    Preoxygenated: yes  Mask difficulty assessment: 0 - not attempted    Final Airway Details  Final airway type: endotracheal airway      Successful airway: ETT  Cuffed: yes   Successful intubation technique: direct laryngoscopy  Facilitating devices/methods: intubating stylet and cricoid pressure  Endotracheal tube insertion site: oral  Blade: Christian  Blade size: 2  ETT size (mm): 7.5  Cormack-Lehane Classification: grade I - full view of glottis  Placement verified by: chest auscultation and capnometry   Measured from: lips  ETT to lips (cm): 20  Number of attempts at approach: 1

## 2018-12-22 NOTE — ADDENDUM NOTE
Addendum  created 12/21/18 1946 by Osmin Negrete MD    Intraprocedure Attestations filed, Intraprocedure Staff edited

## 2018-12-22 NOTE — ANESTHESIA POSTPROCEDURE EVALUATION
Patient: Julia Gay    Procedure Summary     Date:  12/21/18 Room / Location:  [facility] OR 06 / [facility] OR    Anesthesia Start:  1808 Anesthesia Stop:  1851    Procedure:  CAUTERIZE NASAL BLEEDERS (N/A Nose) Diagnosis:       Epistaxis      Anemia due to acute blood loss      (Epistaxis [R04.0])      (Anemia due to acute blood loss [D62])    Surgeon:  Som Redd MD Provider:  Vance Freed CRNA    Anesthesia Type:  general ASA Status:  3 - Emergent          Anesthesia Type: general  Last vitals  BP       Temp       Pulse       Resp         SpO2         Post Anesthesia Care and Evaluation    Patient location during evaluation: PACU  Patient participation: complete - patient participated  Level of consciousness: lethargic  Pain score: 0  Pain management: adequate  Airway patency: patent  Anesthetic complications: No anesthetic complications  PONV Status: none  Cardiovascular status: acceptable  Respiratory status: acceptable  Hydration status: acceptable

## 2018-12-26 RX ORDER — FLUTICASONE PROPIONATE 50 MCG
SPRAY, SUSPENSION (ML) NASAL
Qty: 16 ML | Refills: 0 | Status: SHIPPED | OUTPATIENT
Start: 2018-12-26 | End: 2019-03-07 | Stop reason: SDUPTHER

## 2018-12-26 RX ORDER — CITALOPRAM 40 MG/1
40 TABLET ORAL DAILY
Qty: 30 TABLET | Refills: 0 | Status: SHIPPED | OUTPATIENT
Start: 2018-12-26 | End: 2019-05-17 | Stop reason: SDUPTHER

## 2018-12-27 ENCOUNTER — OFFICE VISIT (OUTPATIENT)
Dept: OTOLARYNGOLOGY | Facility: CLINIC | Age: 48
End: 2018-12-27

## 2018-12-27 VITALS — WEIGHT: 151 LBS | BODY MASS INDEX: 22.88 KG/M2 | TEMPERATURE: 97.4 F | HEIGHT: 68 IN

## 2018-12-27 DIAGNOSIS — Z09 POSTOP CHECK: Primary | ICD-10-CM

## 2018-12-27 PROCEDURE — 99024 POSTOP FOLLOW-UP VISIT: CPT | Performed by: OTOLARYNGOLOGY

## 2018-12-27 RX ORDER — HYDROCODONE BITARTRATE AND ACETAMINOPHEN 5; 325 MG/1; MG/1
1 TABLET ORAL EVERY 6 HOURS PRN
Qty: 15 TABLET | Refills: 0 | Status: SHIPPED | OUTPATIENT
Start: 2018-12-27 | End: 2019-05-17

## 2018-12-27 NOTE — PATIENT INSTRUCTIONS

## 2018-12-27 NOTE — PROGRESS NOTES
Mr. Gay comes in postop.  He's had no further bleeding says she's been irrigating.  I removed his stents.  He septal ulceration he had his doing better.  I discussed the critical need for not using picking at that area which is almost worn a hole through his nose causing a septal perforation the left side mucosa looks give the right is were has ulceration.  I suggested that he put ointment on the area very gently twice a day and provided samples of bacitracin he is to continue irrigating his nose.  Since his off the anticoagulant except for the aspirin hopefully he'll have less problems of bleeding.  He's very thankful for that when his nose is healing and is feeling better he did request pain medicine from work and had to cauterize I told him that I can give him a few days because he had to take extra because the surgery.  Other pain medicine should be gotten from his regular provider.  Explains use and side effects warned about drowsiness and gave him a prescription for 15.  He will see me back in 3 weeks dizziness or problems or questions  .   he's be careful not to traumatize the nose.  ABA reviewed.#[id number]  DANYELLE Redd M.D.

## 2019-01-02 ENCOUNTER — DOCUMENTATION (OUTPATIENT)
Dept: OTOLARYNGOLOGY | Facility: CLINIC | Age: 49
End: 2019-01-02

## 2019-01-02 NOTE — PROGRESS NOTES
Per Janessa MCMILLAN, an addendum was placed in patients chart for 12/21/2018.  This is  charges for surgicel that was used on that date.

## 2019-03-07 RX ORDER — FLUTICASONE PROPIONATE 50 MCG
SPRAY, SUSPENSION (ML) NASAL
Qty: 16 ML | Refills: 0 | Status: SHIPPED | OUTPATIENT
Start: 2019-03-07 | End: 2019-04-10 | Stop reason: SDUPTHER

## 2019-04-10 RX ORDER — FLUTICASONE PROPIONATE 50 MCG
SPRAY, SUSPENSION (ML) NASAL
Qty: 16 ML | Refills: 0 | Status: SHIPPED | OUTPATIENT
Start: 2019-04-10 | End: 2019-08-15

## 2019-05-17 ENCOUNTER — OFFICE VISIT (OUTPATIENT)
Dept: FAMILY MEDICINE CLINIC | Facility: CLINIC | Age: 49
End: 2019-05-17

## 2019-05-17 ENCOUNTER — APPOINTMENT (OUTPATIENT)
Dept: LAB | Facility: HOSPITAL | Age: 49
End: 2019-05-17

## 2019-05-17 VITALS
DIASTOLIC BLOOD PRESSURE: 80 MMHG | BODY MASS INDEX: 22.58 KG/M2 | SYSTOLIC BLOOD PRESSURE: 128 MMHG | WEIGHT: 149 LBS | HEIGHT: 68 IN

## 2019-05-17 DIAGNOSIS — J06.9 ACUTE URI: ICD-10-CM

## 2019-05-17 DIAGNOSIS — Z72.0 TOBACCO ABUSE: Chronic | ICD-10-CM

## 2019-05-17 DIAGNOSIS — Z72.0 TOBACCO USE: ICD-10-CM

## 2019-05-17 DIAGNOSIS — I25.10 CAD IN NATIVE ARTERY: Chronic | ICD-10-CM

## 2019-05-17 DIAGNOSIS — I10 BENIGN HYPERTENSION: Primary | Chronic | ICD-10-CM

## 2019-05-17 DIAGNOSIS — Z12.11 SCREEN FOR COLON CANCER: ICD-10-CM

## 2019-05-17 DIAGNOSIS — G89.29 CHRONIC BILATERAL LOW BACK PAIN WITHOUT SCIATICA: Chronic | ICD-10-CM

## 2019-05-17 DIAGNOSIS — R05.9 COUGH: ICD-10-CM

## 2019-05-17 DIAGNOSIS — M54.50 CHRONIC BILATERAL LOW BACK PAIN WITHOUT SCIATICA: Chronic | ICD-10-CM

## 2019-05-17 DIAGNOSIS — M79.641 HAND PAIN, RIGHT: ICD-10-CM

## 2019-05-17 DIAGNOSIS — D62 ANEMIA DUE TO ACUTE BLOOD LOSS: Chronic | ICD-10-CM

## 2019-05-17 DIAGNOSIS — Z23 NEED FOR VACCINATION: ICD-10-CM

## 2019-05-17 PROCEDURE — 80053 COMPREHEN METABOLIC PANEL: CPT | Performed by: FAMILY MEDICINE

## 2019-05-17 PROCEDURE — 99214 OFFICE O/P EST MOD 30 MIN: CPT | Performed by: FAMILY MEDICINE

## 2019-05-17 PROCEDURE — 36415 COLL VENOUS BLD VENIPUNCTURE: CPT | Performed by: FAMILY MEDICINE

## 2019-05-17 PROCEDURE — 90670 PCV13 VACCINE IM: CPT | Performed by: FAMILY MEDICINE

## 2019-05-17 PROCEDURE — 85018 HEMOGLOBIN: CPT | Performed by: FAMILY MEDICINE

## 2019-05-17 PROCEDURE — 80061 LIPID PANEL: CPT | Performed by: FAMILY MEDICINE

## 2019-05-17 PROCEDURE — 85014 HEMATOCRIT: CPT | Performed by: FAMILY MEDICINE

## 2019-05-17 PROCEDURE — G0009 ADMIN PNEUMOCOCCAL VACCINE: HCPCS | Performed by: FAMILY MEDICINE

## 2019-05-17 RX ORDER — OMEPRAZOLE 20 MG/1
20 CAPSULE, DELAYED RELEASE ORAL DAILY
Qty: 90 CAPSULE | Refills: 3 | Status: SHIPPED | OUTPATIENT
Start: 2019-05-17 | End: 2019-08-15

## 2019-05-17 RX ORDER — MELOXICAM 7.5 MG/1
7.5 TABLET ORAL DAILY
Qty: 2 TABLET | Refills: 2 | Status: SHIPPED | OUTPATIENT
Start: 2019-05-17 | End: 2019-08-12 | Stop reason: SDUPTHER

## 2019-05-17 RX ORDER — NITROGLYCERIN 0.3 MG/1
0.3 TABLET SUBLINGUAL AS NEEDED
Qty: 30 TABLET | Refills: 5 | Status: SHIPPED | OUTPATIENT
Start: 2019-05-17 | End: 2019-08-15

## 2019-05-17 RX ORDER — ASPIRIN 81 MG/1
81 TABLET ORAL DAILY
Qty: 90 TABLET | Refills: 3 | Status: SHIPPED | OUTPATIENT
Start: 2019-05-17

## 2019-05-17 RX ORDER — RANOLAZINE 500 MG/1
500 TABLET, EXTENDED RELEASE ORAL 2 TIMES DAILY
Qty: 180 TABLET | Refills: 3 | Status: SHIPPED | OUTPATIENT
Start: 2019-05-17 | End: 2019-08-15

## 2019-05-17 RX ORDER — ATORVASTATIN CALCIUM 20 MG/1
20 TABLET, FILM COATED ORAL NIGHTLY
Qty: 90 TABLET | Refills: 3 | Status: SHIPPED | OUTPATIENT
Start: 2019-05-17 | End: 2020-02-17 | Stop reason: SDUPTHER

## 2019-05-17 RX ORDER — ALBUTEROL SULFATE 90 UG/1
2 AEROSOL, METERED RESPIRATORY (INHALATION) EVERY 4 HOURS PRN
Qty: 18 G | Refills: 11 | Status: SHIPPED | OUTPATIENT
Start: 2019-05-17 | End: 2021-10-18 | Stop reason: SDUPTHER

## 2019-05-17 RX ORDER — CARVEDILOL 3.12 MG/1
3.12 TABLET ORAL 2 TIMES DAILY WITH MEALS
Qty: 180 TABLET | Refills: 3 | Status: SHIPPED | OUTPATIENT
Start: 2019-05-17 | End: 2020-02-17 | Stop reason: SDUPTHER

## 2019-05-17 RX ORDER — CITALOPRAM 40 MG/1
40 TABLET ORAL DAILY
Qty: 90 TABLET | Refills: 1 | Status: SHIPPED | OUTPATIENT
Start: 2019-05-17 | End: 2020-02-17 | Stop reason: SDUPTHER

## 2019-05-17 NOTE — PROGRESS NOTES
Subjective   Thelbert Ananda Gay is a 49 y.o. male.     History of Present Illness   requesting refill medication diagnoses tobacco abuse history of coronary disease chronic back pain other diagnoses as listed below.  Time for lab work.  Was found to be anemic after last bleed from the nose back in December needs to be repeated.  States main complaint today is that of hand pain right as well as off and on with use.  Continues to see pain management as well.  Eyes has turned 48 needs colonoscopy and start pneumonia series.    The following portions of the patient's history were reviewed and updated as appropriate: allergies, current medications, past family history, past medical history, past social history, past surgical history and problem list.    Review of Systems   Constitutional: Negative for activity change, appetite change, fatigue and unexpected weight change.   HENT: Negative for trouble swallowing and voice change.    Eyes: Negative for redness and visual disturbance.   Respiratory: Negative for cough and wheezing.    Cardiovascular: Negative for chest pain and palpitations.   Gastrointestinal: Negative for abdominal pain, constipation, diarrhea, nausea and vomiting.   Genitourinary: Negative for urgency.   Musculoskeletal: Positive for arthralgias and back pain. Negative for joint swelling.   Neurological: Negative for syncope and headaches.   Hematological: Negative for adenopathy.   Psychiatric/Behavioral: Negative for sleep disturbance.       Objective   Physical Exam   Constitutional: He is oriented to person, place, and time. He appears well-developed.   HENT:   Head: Normocephalic.   Right Ear: External ear normal.   Nose: Nose normal.   Mouth/Throat: Oropharynx is clear and moist.   Poor dentition   Eyes: Pupils are equal, round, and reactive to light.   Neck: Normal range of motion. No thyromegaly present.   Cardiovascular: Normal rate, regular rhythm, normal heart sounds and intact distal  pulses. Exam reveals no gallop and no friction rub.   No murmur heard.  Pulmonary/Chest: Breath sounds normal.   Abdominal: Soft. He exhibits no distension and no mass. There is no tenderness.   Musculoskeletal: Normal range of motion.   2+ pulses no skin breakdown mild hair loss.  Upper extremities hands show minimal DJD changes.  There is some mild swelling along the tendon sheath of the little finger on the right no blanching.  Normal range of motion.  Ring finger minimally involved.  Elbow normal.   Neurological: He is alert and oriented to person, place, and time. He has normal reflexes.   Skin: Skin is warm and dry.   Psychiatric: His speech is normal and behavior is normal. His affect is blunt.       Assessment/Plan   Julia was seen today for follow-up.    Diagnoses and all orders for this visit:    Benign hypertension  -     Comprehensive Metabolic Panel    CAD in native artery  -     ranolazine (RANEXA) 500 MG 12 hr tablet; Take 1 tablet by mouth 2 (Two) Times a Day. Med Name: Ranexa 500 mg tablet,extended release  -     carvedilol (COREG) 3.125 MG tablet; Take 1 tablet by mouth 2 (Two) Times a Day With Meals.  -     atorvastatin (LIPITOR) 20 MG tablet; Take 1 tablet by mouth Every Night. at bedtime; Indication: cholesterol  -     Lipid Panel With LDL / HDL Ratio  -     Comprehensive Metabolic Panel  -     aspirin 81 MG EC tablet; Take 1 tablet by mouth Daily.    Anemia due to acute blood loss  -     Hemoglobin & Hematocrit, Blood    Tobacco abuse    Chronic bilateral low back pain without sciatica  -     citalopram (CeleXA) 40 MG tablet; Take 1 tablet by mouth Daily.    Need for vaccination  -     Pneumococcal Conjugate Vaccine 13-Valent All    Hand pain, right    Tobacco use  -     albuterol sulfate  (90 Base) MCG/ACT inhaler; Inhale 2 puffs Every 4 (Four) Hours As Needed for Wheezing.    Acute URI  -     albuterol sulfate  (90 Base) MCG/ACT inhaler; Inhale 2 puffs Every 4 (Four) Hours As  Needed for Wheezing.    Cough  -     albuterol sulfate  (90 Base) MCG/ACT inhaler; Inhale 2 puffs Every 4 (Four) Hours As Needed for Wheezing.    Screen for colon cancer  -     Ambulatory Referral For Screening Colonoscopy    Other orders  -     omeprazole (priLOSEC) 20 MG capsule; Take 1 capsule by mouth Daily.  -     nitroglycerin (NITROSTAT) 0.3 MG SL tablet; Place 1 tablet under the tongue As Needed for Chest Pain.  -     meloxicam (MOBIC) 7.5 MG tablet; Take 1 tablet by mouth Daily. For 5 days then prn hand       on stopping smoking.  3-10m     Counseled mainly on lifestyle measures.  Lab work ordered.  For the hand cool water compresses etc.  Stretching etc. short-term anti-inflammatory use only not long-term.  Counseled mainly on lifestyle measures today.  Orders as above recheck 6 months be time for second pneumonia vaccine

## 2019-05-18 LAB
ALBUMIN SERPL-MCNC: 4.3 G/DL (ref 3.5–5.2)
ALBUMIN/GLOB SERPL: 1.3 G/DL
ALP SERPL-CCNC: 80 U/L (ref 39–117)
ALT SERPL W P-5'-P-CCNC: 15 U/L (ref 1–41)
ANION GAP SERPL CALCULATED.3IONS-SCNC: 11.7 MMOL/L
AST SERPL-CCNC: 21 U/L (ref 1–40)
BILIRUB SERPL-MCNC: 0.8 MG/DL (ref 0.2–1.2)
BUN BLD-MCNC: 11 MG/DL (ref 6–20)
BUN/CREAT SERPL: 13.9 (ref 7–25)
CALCIUM SPEC-SCNC: 9.5 MG/DL (ref 8.6–10.5)
CHLORIDE SERPL-SCNC: 104 MMOL/L (ref 98–107)
CHOLEST SERPL-MCNC: 154 MG/DL (ref 0–200)
CO2 SERPL-SCNC: 25.3 MMOL/L (ref 22–29)
CREAT BLD-MCNC: 0.79 MG/DL (ref 0.76–1.27)
GFR SERPL CREATININE-BSD FRML MDRD: 103 ML/MIN/1.73
GLOBULIN UR ELPH-MCNC: 3.2 GM/DL
GLUCOSE BLD-MCNC: 87 MG/DL (ref 65–99)
HCT VFR BLD AUTO: 39.5 % (ref 37.5–51)
HDLC SERPL-MCNC: 42 MG/DL (ref 40–60)
HGB BLD-MCNC: 13.4 G/DL (ref 13–17.7)
LDLC SERPL CALC-MCNC: 93 MG/DL (ref 0–100)
LDLC/HDLC SERPL: 2.21 {RATIO}
POTASSIUM BLD-SCNC: 4.3 MMOL/L (ref 3.5–5.2)
PROT SERPL-MCNC: 7.5 G/DL (ref 6–8.5)
SODIUM BLD-SCNC: 141 MMOL/L (ref 136–145)
TRIGL SERPL-MCNC: 96 MG/DL (ref 0–150)
VLDLC SERPL-MCNC: 19.2 MG/DL (ref 5–40)

## 2019-05-23 ENCOUNTER — PREP FOR SURGERY (OUTPATIENT)
Dept: OTHER | Facility: HOSPITAL | Age: 49
End: 2019-05-23

## 2019-05-23 DIAGNOSIS — Z12.11 SCREEN FOR COLON CANCER: Primary | ICD-10-CM

## 2019-05-23 RX ORDER — DEXTROSE AND SODIUM CHLORIDE 5; .45 G/100ML; G/100ML
100 INJECTION, SOLUTION INTRAVENOUS CONTINUOUS
Status: CANCELLED | OUTPATIENT
Start: 2019-05-23

## 2019-05-29 PROBLEM — Z12.11 SCREEN FOR COLON CANCER: Status: ACTIVE | Noted: 2019-05-29

## 2019-08-12 RX ORDER — MELOXICAM 7.5 MG/1
TABLET ORAL
Qty: 90 TABLET | Refills: 3 | Status: SHIPPED | OUTPATIENT
Start: 2019-08-12 | End: 2019-08-15

## 2019-08-14 DIAGNOSIS — M25.521 RIGHT ELBOW PAIN: Primary | ICD-10-CM

## 2019-08-15 ENCOUNTER — OFFICE VISIT (OUTPATIENT)
Dept: ORTHOPEDIC SURGERY | Facility: CLINIC | Age: 49
End: 2019-08-15

## 2019-08-15 VITALS — HEIGHT: 68 IN | WEIGHT: 147 LBS | BODY MASS INDEX: 22.28 KG/M2

## 2019-08-15 DIAGNOSIS — M25.522 LEFT ELBOW PAIN: ICD-10-CM

## 2019-08-15 DIAGNOSIS — M77.12 LATERAL EPICONDYLITIS OF LEFT ELBOW: Primary | ICD-10-CM

## 2019-08-15 PROCEDURE — 20605 DRAIN/INJ JOINT/BURSA W/O US: CPT | Performed by: NURSE PRACTITIONER

## 2019-08-15 PROCEDURE — 99214 OFFICE O/P EST MOD 30 MIN: CPT | Performed by: NURSE PRACTITIONER

## 2019-08-15 RX ORDER — HYDROCODONE BITARTRATE AND ACETAMINOPHEN 10; 325 MG/1; MG/1
1 TABLET ORAL EVERY 6 HOURS PRN
COMMUNITY
End: 2022-02-14

## 2019-08-15 RX ORDER — PRASUGREL 10 MG/1
10 TABLET, FILM COATED ORAL DAILY
COMMUNITY

## 2019-08-15 RX ADMIN — TRIAMCINOLONE ACETONIDE 40 MG: 40 INJECTION, SUSPENSION INTRA-ARTICULAR; INTRAMUSCULAR at 15:59

## 2019-08-15 RX ADMIN — LIDOCAINE HYDROCHLORIDE 1 ML: 10 INJECTION, SOLUTION EPIDURAL; INFILTRATION; INTRACAUDAL; PERINEURAL at 15:59

## 2019-08-15 NOTE — PROGRESS NOTES
Julia Gay is a 49 y.o. male   Primary provider:  Lucius Wilson MD       Chief Complaint   Patient presents with   • Left Elbow - Elbow Pain       HISTORY OF PRESENT ILLNESS:    Elbow Pain   This is a chronic problem. The current episode started more than 1 month ago (patient states pain started about 2 months ago, no injury. ). The problem occurs constantly. The problem has been unchanged. Associated symptoms include headaches and joint swelling. Associated symptoms comments: Stabbing, burning, swelling, shooting pain. . Exacerbated by: driving.  He has tried ice and heat (xrays done today) for the symptoms.        CONCURRENT MEDICAL HISTORY:    Past Medical History:   Diagnosis Date   • Acute bronchitis    • Astigmatism    • Atherosclerotic heart disease of native coronary artery without angina pectoris    • Backache    • Benign hypertension    • Common cold    • Cough    • Dyspnea    • Folliculitis    • GERD (gastroesophageal reflux disease)    • History of echocardiogram 03/20/2012    Echocardiogram W/O color flow 55571 (1) - LV dilstation with mild LV dysfunction with EF 45%. Septal hypokinesis. Diastolic relaxation abnormality of left ventricle. Mild mitral and tricuspid regurgitation w/mild pulmonary insufficiency.   • Myopia    • Tobacco dependence syndrome        Allergies   Allergen Reactions   • Penicillins Swelling     Facial swelling         Current Outpatient Medications:   •  aspirin 81 MG EC tablet, Take 1 tablet by mouth Daily., Disp: 90 tablet, Rfl: 3  •  atorvastatin (LIPITOR) 20 MG tablet, Take 1 tablet by mouth Every Night. at bedtime; Indication: cholesterol, Disp: 90 tablet, Rfl: 3  •  carvedilol (COREG) 3.125 MG tablet, Take 1 tablet by mouth 2 (Two) Times a Day With Meals., Disp: 180 tablet, Rfl: 3  •  citalopram (CeleXA) 40 MG tablet, Take 1 tablet by mouth Daily., Disp: 90 tablet, Rfl: 1  •  gabapentin (NEURONTIN) 300 MG capsule, Take 300 mg by mouth daily., Disp: , Rfl:   •   HYDROcodone-acetaminophen (NORCO)  MG per tablet, Take 1 tablet by mouth Every 6 (Six) Hours As Needed for Moderate Pain ., Disp: , Rfl:   •  prasugrel (EFFIENT) 10 MG tablet, Take 10 mg by mouth Daily., Disp: , Rfl:   •  albuterol sulfate  (90 Base) MCG/ACT inhaler, Inhale 2 puffs Every 4 (Four) Hours As Needed for Wheezing., Disp: 18 g, Rfl: 11    Past Surgical History:   Procedure Laterality Date   • CARDIAC SURGERY  2000    cabg   • CAUTERIZATION NASAL BLEEDERS N/A 12/21/2018    Procedure: CAUTERIZE NASAL BLEEDERS;  Surgeon: Som Redd MD;  Location: [facility];  Service: ENT   • CORONARY ANGIOPLASTY WITH STENT PLACEMENT  2013    STENTS X 5   • ENDOSCOPIC FUNCTIONAL SINUS SURGERY (FESS) Bilateral 11/27/2018    Procedure: NASAL SEPTAL REPAIR, BILATERAL PARTIAL INFERIOR TURBINECTOMIES, BILATERAL FRONTAL ENDOSCOPIC SINUS SURGERY, RIGHT ENDOSCOPIC ETHMOID SINUS SURGERY, RIGHT ENDOSCOPIC BILATERAL SINUS SURGERY;  Surgeon: Som Redd MD;  Location: [facility];  Service: ENT   • HERNIA REPAIR      x 2       Family History   Problem Relation Age of Onset   • Heart disease Mother    • Hypertension Mother    • Arthritis Mother    • Heart disease Father    • Hypertension Father    • Arthritis Father    • Hypertension Sister    • Heart disease Sister    • Heart disease Brother    • Hypertension Brother         Social History     Socioeconomic History   • Marital status:      Spouse name: Not on file   • Number of children: Not on file   • Years of education: Not on file   • Highest education level: Not on file   Tobacco Use   • Smoking status: Current Every Day Smoker     Packs/day: 1.00     Years: 40.00     Pack years: 40.00   • Smokeless tobacco: Never Used   Substance and Sexual Activity   • Alcohol use: No   • Drug use: No   • Sexual activity: Defer     Comment: Marital status:         Review of Systems   HENT: Positive for hearing loss.    Endocrine: Positive for heat intolerance.  "  Musculoskeletal: Positive for joint swelling.   Neurological: Positive for headaches.   Psychiatric/Behavioral: Positive for sleep disturbance. The patient is nervous/anxious.    All other systems reviewed and are negative.      PHYSICAL EXAMINATION:       Ht 172.7 cm (68\")   Wt 66.7 kg (147 lb)   BMI 22.35 kg/m²     Physical Exam   Constitutional: He is oriented to person, place, and time. Vital signs are normal. He appears well-developed and well-nourished. He is cooperative.   HENT:   Head: Normocephalic and atraumatic.   Neck: Trachea normal and phonation normal.   Pulmonary/Chest: Effort normal. No respiratory distress.   Abdominal: Soft. Normal appearance. He exhibits no distension.   Neurological: He is alert and oriented to person, place, and time. GCS eye subscore is 4. GCS verbal subscore is 5. GCS motor subscore is 6.   Skin: Skin is warm, dry and intact.   Psychiatric: He has a normal mood and affect. His speech is normal and behavior is normal. Judgment and thought content normal. Cognition and memory are normal.   Vitals reviewed.      GAIT:     [x]  Normal  []  Antalgic    Assistive device: [x]  None  []  Walker     []  Crutches  []  Cane     []  Wheelchair  []  Stretcher    Right Elbow Exam   Right elbow exam is normal.      Left Elbow Exam     Tenderness   The patient is experiencing tenderness in the lateral epicondyle.     Range of Motion   Extension: normal   Flexion: normal   Pronation: normal   Supination: normal     Muscle Strength   Pronation:  5 and 4/5   Supination:  4/5     Tests   Tinel's sign (cubital tunnel): negative    Other   Erythema: absent  Scars: absent  Sensation: normal  Pulse: present              Xr Elbow 3+ View Left    Result Date: 8/16/2019  Narrative: Ordering Provider:  Jorden Ortega APRN Ordering Diagnosis/Indication:  Left elbow pain Procedure:  XR ELBOW 3+ VW LEFT Exam Date:  8/15/19 COMPARISON:  Not applicable, no relevant images available.     Impression:  " AP lateral and axial view of the left elbow reveals a small ossified fragment of the medial condyle with no evidence of acute fracture, dislocation or other radiological abnormality.  There is a old amount of arthritic change noted in the elbow. BISMARK Dougherty 8/15/19           ASSESSMENT:    Diagnoses and all orders for this visit:    Lateral epicondylitis of left elbow  -     Medium Joint Arthrocentesis: L elbow  -     Ambulatory Referral to Physical Therapy Evaluate and treat    Left elbow pain  -     Medium Joint Arthrocentesis: L elbow  -     Ambulatory Referral to Physical Therapy Evaluate and treat    Other orders  -     prasugrel (EFFIENT) 10 MG tablet; Take 10 mg by mouth Daily.  -     HYDROcodone-acetaminophen (NORCO)  MG per tablet; Take 1 tablet by mouth Every 6 (Six) Hours As Needed for Moderate Pain .          PLAN    Medium Joint Arthrocentesis: L elbow  Date/Time: 8/15/2019 3:59 PM  Consent given by: patient  Site marked: site marked  Timeout: Immediately prior to procedure a time out was called to verify the correct patient, procedure, equipment, support staff and site/side marked as required   Supporting Documentation  Indications: pain   Procedure Details  Location: elbow - L elbow  Preparation: Patient was prepped and draped in the usual sterile fashion  Needle size: 22 G  Approach: anterolateral  Medications administered: 40 mg triamcinolone acetonide 40 MG/ML; 1 mL lidocaine PF 1% 1 %          Discussed treatment options for lateral epicondylitis with the patient in detail recommending a course of physical therapy, and injection and continued use of an anti-inflammatory and follow-up in 4 to 6 weeks for recheck.    No Follow-up on file.    Jorden Ortega, BISMARK

## 2019-08-16 RX ORDER — LIDOCAINE HYDROCHLORIDE 10 MG/ML
1 INJECTION, SOLUTION EPIDURAL; INFILTRATION; INTRACAUDAL; PERINEURAL
Status: COMPLETED | OUTPATIENT
Start: 2019-08-15 | End: 2019-08-15

## 2019-08-16 RX ORDER — TRIAMCINOLONE ACETONIDE 40 MG/ML
40 INJECTION, SUSPENSION INTRA-ARTICULAR; INTRAMUSCULAR
Status: COMPLETED | OUTPATIENT
Start: 2019-08-15 | End: 2019-08-15

## 2019-12-06 ENCOUNTER — TRANSCRIBE ORDERS (OUTPATIENT)
Dept: MRI IMAGING | Facility: HOSPITAL | Age: 49
End: 2019-12-06

## 2019-12-06 DIAGNOSIS — M54.50 LOW BACK PAIN, UNSPECIFIED BACK PAIN LATERALITY, UNSPECIFIED CHRONICITY, UNSPECIFIED WHETHER SCIATICA PRESENT: ICD-10-CM

## 2019-12-06 DIAGNOSIS — M47.26 OTHER SPONDYLOSIS WITH RADICULOPATHY, LUMBAR REGION: Primary | ICD-10-CM

## 2019-12-16 ENCOUNTER — HOSPITAL ENCOUNTER (OUTPATIENT)
Dept: MRI IMAGING | Facility: HOSPITAL | Age: 49
Discharge: HOME | End: 2019-12-16
Admitting: PAIN MEDICINE

## 2019-12-16 DIAGNOSIS — M47.26 OTHER SPONDYLOSIS WITH RADICULOPATHY, LUMBAR REGION: ICD-10-CM

## 2019-12-16 DIAGNOSIS — M54.50 LOW BACK PAIN, UNSPECIFIED BACK PAIN LATERALITY, UNSPECIFIED CHRONICITY, UNSPECIFIED WHETHER SCIATICA PRESENT: ICD-10-CM

## 2019-12-16 PROCEDURE — 72148 MRI LUMBAR SPINE W/O DYE: CPT

## 2020-01-15 DIAGNOSIS — G89.29 CHRONIC BILATERAL LOW BACK PAIN WITHOUT SCIATICA: Chronic | ICD-10-CM

## 2020-01-15 DIAGNOSIS — M54.50 CHRONIC BILATERAL LOW BACK PAIN WITHOUT SCIATICA: Chronic | ICD-10-CM

## 2020-01-15 RX ORDER — CITALOPRAM 40 MG/1
40 TABLET ORAL DAILY
Qty: 90 TABLET | Refills: 1 | OUTPATIENT
Start: 2020-01-15

## 2020-02-17 ENCOUNTER — OFFICE VISIT (OUTPATIENT)
Dept: FAMILY MEDICINE CLINIC | Facility: CLINIC | Age: 50
End: 2020-02-17

## 2020-02-17 VITALS
BODY MASS INDEX: 22.72 KG/M2 | WEIGHT: 149.9 LBS | HEIGHT: 68 IN | SYSTOLIC BLOOD PRESSURE: 118 MMHG | DIASTOLIC BLOOD PRESSURE: 76 MMHG

## 2020-02-17 DIAGNOSIS — I10 BENIGN HYPERTENSION: Primary | Chronic | ICD-10-CM

## 2020-02-17 DIAGNOSIS — Z23 NEED FOR VACCINATION: ICD-10-CM

## 2020-02-17 DIAGNOSIS — Z12.11 SCREEN FOR COLON CANCER: ICD-10-CM

## 2020-02-17 DIAGNOSIS — M54.50 CHRONIC BILATERAL LOW BACK PAIN WITHOUT SCIATICA: Chronic | ICD-10-CM

## 2020-02-17 DIAGNOSIS — G89.29 CHRONIC BILATERAL LOW BACK PAIN WITHOUT SCIATICA: Chronic | ICD-10-CM

## 2020-02-17 DIAGNOSIS — I25.10 CAD IN NATIVE ARTERY: Chronic | ICD-10-CM

## 2020-02-17 DIAGNOSIS — Z72.0 TOBACCO ABUSE: Chronic | ICD-10-CM

## 2020-02-17 PROBLEM — J32.0 CHRONIC MAXILLARY SINUSITIS: Status: RESOLVED | Noted: 2018-11-20 | Resolved: 2020-02-17

## 2020-02-17 PROBLEM — D62 ANEMIA DUE TO ACUTE BLOOD LOSS: Chronic | Status: RESOLVED | Noted: 2018-12-21 | Resolved: 2020-02-17

## 2020-02-17 PROBLEM — J34.3 NASAL TURBINATE HYPERTROPHY: Status: RESOLVED | Noted: 2018-11-20 | Resolved: 2020-02-17

## 2020-02-17 PROBLEM — R04.0 EPISTAXIS: Chronic | Status: ACTIVE | Noted: 2018-12-21

## 2020-02-17 PROBLEM — J34.2 NASAL SEPTAL DEVIATION: Status: RESOLVED | Noted: 2018-11-20 | Resolved: 2020-02-17

## 2020-02-17 PROBLEM — R04.0 EPISTAXIS: Chronic | Status: RESOLVED | Noted: 2018-12-21 | Resolved: 2020-02-17

## 2020-02-17 PROBLEM — J32.1 CHRONIC FRONTAL SINUSITIS: Status: RESOLVED | Noted: 2018-11-20 | Resolved: 2020-02-17

## 2020-02-17 PROBLEM — J32.2 CHRONIC ETHMOIDAL SINUSITIS: Status: RESOLVED | Noted: 2018-11-20 | Resolved: 2020-02-17

## 2020-02-17 PROBLEM — J34.89 NASAL OBSTRUCTION: Status: RESOLVED | Noted: 2018-11-20 | Resolved: 2020-02-17

## 2020-02-17 PROCEDURE — G0008 ADMIN INFLUENZA VIRUS VAC: HCPCS | Performed by: FAMILY MEDICINE

## 2020-02-17 PROCEDURE — G0439 PPPS, SUBSEQ VISIT: HCPCS | Performed by: FAMILY MEDICINE

## 2020-02-17 PROCEDURE — 90732 PPSV23 VACC 2 YRS+ SUBQ/IM: CPT | Performed by: FAMILY MEDICINE

## 2020-02-17 PROCEDURE — G0009 ADMIN PNEUMOCOCCAL VACCINE: HCPCS | Performed by: FAMILY MEDICINE

## 2020-02-17 PROCEDURE — 90674 CCIIV4 VAC NO PRSV 0.5 ML IM: CPT | Performed by: FAMILY MEDICINE

## 2020-02-17 PROCEDURE — 99214 OFFICE O/P EST MOD 30 MIN: CPT | Performed by: FAMILY MEDICINE

## 2020-02-17 RX ORDER — CITALOPRAM 40 MG/1
40 TABLET ORAL DAILY
Qty: 90 TABLET | Refills: 1 | Status: SHIPPED | OUTPATIENT
Start: 2020-02-17 | End: 2020-08-31

## 2020-02-17 RX ORDER — ATORVASTATIN CALCIUM 20 MG/1
20 TABLET, FILM COATED ORAL NIGHTLY
Qty: 90 TABLET | Refills: 3 | Status: SHIPPED | OUTPATIENT
Start: 2020-02-17

## 2020-02-17 RX ORDER — BACLOFEN 10 MG/1
TABLET ORAL
COMMUNITY
Start: 2020-01-20

## 2020-02-17 RX ORDER — MELOXICAM 15 MG/1
15 TABLET ORAL DAILY
COMMUNITY
End: 2021-10-18 | Stop reason: SDUPTHER

## 2020-02-17 RX ORDER — CARVEDILOL 3.12 MG/1
3.12 TABLET ORAL 2 TIMES DAILY WITH MEALS
Qty: 180 TABLET | Refills: 3 | Status: SHIPPED | OUTPATIENT
Start: 2020-02-17

## 2020-02-17 NOTE — PROGRESS NOTES
The ABCs of the Annual Wellness Visit  Initial Medicare Wellness Visit    Chief Complaint   Patient presents with   • Hypertension     6 month reval       Subjective   History of Present Illness:  Julia Gay is a 49 y.o. male who presents for an Initial Medicare Wellness Visit.    HEALTH RISK ASSESSMENT    Recent Hospitalizations:  Recently treated at the following:  [facility]    Current Medical Providers:  Patient Care Team:  Lucius Wilson MD as PCP - General  Camilo Whitley MD as PCP - Claims Attributed    Smoking Status:  Social History     Tobacco Use   Smoking Status Current Every Day Smoker   • Packs/day: 1.00   • Years: 40.00   • Pack years: 40.00   Smokeless Tobacco Never Used       Alcohol Consumption:  Social History     Substance and Sexual Activity   Alcohol Use No       Depression Screen:   PHQ-2/PHQ-9 Depression Screening 2/17/2020   Little interest or pleasure in doing things 0   Feeling down, depressed, or hopeless 1   Total Score 1       Fall Risk Screen:  STEADI Fall Risk Assessment has not been completed.    Health Habits and Functional and Cognitive Screening:  No flowsheet data found.      Does the patient have evidence of cognitive impairment? No    Asprin use counseling:Taking ASA appropriately as indicated    Age-appropriate Screening Schedule:  Refer to the list below for future screening recommendations based on patient's age, sex and/or medical conditions. Orders for these recommended tests are listed in the plan section. The patient has been provided with a written plan.    Health Maintenance   Topic Date Due   • COLONOSCOPY  08/26/2016   • PNEUMOCOCCAL VACCINE (19-64 MEDIUM RISK) (1 of 1 - PPSV23) 07/12/2019   • INFLUENZA VACCINE  08/01/2019   • TDAP/TD VACCINES (2 - Td) 06/06/2027   • ZOSTER VACCINE  Discontinued          The following portions of the patient's history were reviewed and updated as appropriate: allergies, current medications, past  "family history, past medical history, past social history, past surgical history and problem list.    Outpatient Medications Prior to Visit   Medication Sig Dispense Refill   • albuterol sulfate  (90 Base) MCG/ACT inhaler Inhale 2 puffs Every 4 (Four) Hours As Needed for Wheezing. 18 g 11   • aspirin 81 MG EC tablet Take 1 tablet by mouth Daily. 90 tablet 3   • baclofen (LIORESAL) 10 MG tablet      • gabapentin (NEURONTIN) 300 MG capsule Take 300 mg by mouth daily.     • HYDROcodone-acetaminophen (NORCO)  MG per tablet Take 1 tablet by mouth Every 6 (Six) Hours As Needed for Moderate Pain .     • meloxicam (MOBIC) 15 MG tablet Take 15 mg by mouth Daily.     • prasugrel (EFFIENT) 10 MG tablet Take 10 mg by mouth Daily.     • atorvastatin (LIPITOR) 20 MG tablet Take 1 tablet by mouth Every Night. at bedtime; Indication: cholesterol 90 tablet 3   • carvedilol (COREG) 3.125 MG tablet Take 1 tablet by mouth 2 (Two) Times a Day With Meals. 180 tablet 3   • citalopram (CeleXA) 40 MG tablet Take 1 tablet by mouth Daily. 90 tablet 1     No facility-administered medications prior to visit.        Patient Active Problem List   Diagnosis   • Benign hypertension   • Tobacco abuse   • CAD in native artery   • Chronic bilateral low back pain without sciatica       Advanced Care Planning:  ACP discussion was held with the patient during this visit. not 63    Review of Systems    Compared to one year ago, the patient feels his physical health is better.  Compared to one year ago, the patient feels his mental health is the same.    Reviewed chart for potential of high risk medication in the elderly: not applicable  Reviewed chart for potential of harmful drug interactions in the elderly:not applicable    Objective         Vitals:    02/17/20 1448   BP: 118/76   Weight: 68 kg (149 lb 14.4 oz)   Height: 172.7 cm (68\")   PainSc:   5   PainLoc: Back       Body mass index is 22.79 kg/m².  Discussed the patient's BMI with him. " The BMI is in the acceptable range.    Physical Exam          Assessment/Plan   Medicare Risks and Personalized Health Plan  CMS Preventative Services Quick Reference  Chronic Pain   Tobacco Use/Dependance (use dotphrase .tobaccocessation for documentation)    The above risks/problems have been discussed with the patient.  Pertinent information has been shared with the patient in the After Visit Summary.  Follow up plans and orders are seen below in the Assessment/Plan Section.    Diagnoses and all orders for this visit:    1. Benign hypertension (Primary)  -     Comprehensive Metabolic Panel; Future  -     Magnesium; Future    2. CAD in native artery  -     atorvastatin (LIPITOR) 20 MG tablet; Take 1 tablet by mouth Every Night. at bedtime; Indication: cholesterol  Dispense: 90 tablet; Refill: 3  -     carvedilol (COREG) 3.125 MG tablet; Take 1 tablet by mouth 2 (Two) Times a Day With Meals.  Dispense: 180 tablet; Refill: 3  -     Lipid Panel With LDL / HDL Ratio; Future    3. Chronic bilateral low back pain without sciatica  -     citalopram (CeleXA) 40 MG tablet; Take 1 tablet by mouth Daily.  Dispense: 90 tablet; Refill: 1    4. Tobacco abuse    5. Screen for colon cancer  -     Cologuard - Stool, Per Rectum; Future    6. Need for vaccination  -     Pneumococcal Polysaccharide Vaccine 23-Valent Greater Than or Equal To 3yo Subcutaneous / IM  -     Flucelvax Quad=>4Years (Vial)      Follow Up:  No follow-ups on file.     An After Visit Summary and PPPS were given to the patient.

## 2020-02-17 NOTE — PROGRESS NOTES
Subjective   Thelcorinne Gay is a 49 y.o. male.     History of Present Illness   reevaluation hypertension coronary disease tobacco abuse chronic back pain.  In interim is maintained weight and blood pressure taking all medication.  Back still hurts but seeing pain management.  Did not get colonoscopy done last time we will do a Cologuard.  Is also due to update his pneumonia and influenza vaccines.  Is trying to stop smoking is got some Chantix will try that.  Counseled on pros and cons limitations of same.  HPI    The following portions of the patient's history were reviewed and updated as appropriate: allergies, current medications, past family history, past medical history, past social history, past surgical history and problem list.    Review of Systems  Review of Systems   Constitutional: Negative for activity change, appetite change, fatigue and unexpected weight change.   HENT: Negative for trouble swallowing and voice change.    Eyes: Negative for redness and visual disturbance.   Respiratory: Negative for cough and wheezing.    Cardiovascular: Negative for chest pain and palpitations.   Gastrointestinal: Negative for abdominal pain, constipation, diarrhea, nausea and vomiting.   Genitourinary: Negative for urgency.   Musculoskeletal: Positive for back pain (Chronic controlled). Negative for joint swelling.   Neurological: Negative for syncope and headaches.   Hematological: Negative for adenopathy.   Psychiatric/Behavioral: Negative for sleep disturbance.       Objective   Physical Exam  Physical Exam   Constitutional: He is oriented to person, place, and time. He appears well-developed.   HENT:   Head: Normocephalic.   Nose: Nose normal.   Poor dentition   Eyes: Pupils are equal, round, and reactive to light.   Neck: Normal range of motion. No thyromegaly present.   Cardiovascular: Normal rate, regular rhythm, normal heart sounds and intact distal pulses. Exam reveals no gallop and no friction rub.  "  No murmur heard.  Pulmonary/Chest: Breath sounds normal.   Abdominal: Soft. He exhibits no distension and no mass. There is no tenderness.   Musculoskeletal: Normal range of motion.   No peripheral edema 1-2+ pulses get up and go 3 to 5 seconds gait normal   Neurological: He is alert and oriented to person, place, and time. He has normal reflexes.   Skin: Skin is warm and dry.   Psychiatric: He has a normal mood and affect. His speech is normal and behavior is normal.         Visit Vitals  /76   Ht 172.7 cm (68\")   Wt 68 kg (149 lb 14.4 oz)   BMI 22.79 kg/m²     Body mass index is 22.79 kg/m².      Assessment/Plan   Julia was seen today for hypertension.    Diagnoses and all orders for this visit:    Benign hypertension    CAD in native artery  -     atorvastatin (LIPITOR) 20 MG tablet; Take 1 tablet by mouth Every Night. at bedtime; Indication: cholesterol  -     carvedilol (COREG) 3.125 MG tablet; Take 1 tablet by mouth 2 (Two) Times a Day With Meals.    Chronic bilateral low back pain without sciatica  -     citalopram (CeleXA) 40 MG tablet; Take 1 tablet by mouth Daily.    Tobacco abuse    Screen for colon cancer  -     Cologuard - Stool, Per Rectum; Future    Need for vaccination  -     Pneumococcal Polysaccharide Vaccine 23-Valent Greater Than or Equal To 1yo Subcutaneous / IM  -     Flucelvax Quad=>4Years (Vial)     on stopping smoking.  3-10m     Counseled mainly on lifestyle measures infection prevention etc.  Orders as above recheck 6 months lab prior  "

## 2020-07-13 ENCOUNTER — TELEPHONE (OUTPATIENT)
Dept: FAMILY MEDICINE CLINIC | Facility: CLINIC | Age: 50
End: 2020-07-13

## 2020-07-13 DIAGNOSIS — G89.29 CHRONIC BILATERAL LOW BACK PAIN WITHOUT SCIATICA: Chronic | ICD-10-CM

## 2020-07-13 DIAGNOSIS — M54.50 CHRONIC BILATERAL LOW BACK PAIN WITHOUT SCIATICA: Chronic | ICD-10-CM

## 2020-07-13 NOTE — TELEPHONE ENCOUNTER
"Patient called in requesting a refill on his \"Ranexa 500MG\" medication (not on med list) and states he has been without for 3 days. Patients requests this refill be sent to the [id number] - [place] [address] AT [facility] - [phone] [facility] [phone] FX at the earliest convenience.     For any questions or issues, patients call back number is [phone]      "

## 2020-08-30 DIAGNOSIS — G89.29 CHRONIC BILATERAL LOW BACK PAIN WITHOUT SCIATICA: Chronic | ICD-10-CM

## 2020-08-30 DIAGNOSIS — M54.50 CHRONIC BILATERAL LOW BACK PAIN WITHOUT SCIATICA: Chronic | ICD-10-CM

## 2020-08-31 RX ORDER — CITALOPRAM 40 MG/1
40 TABLET ORAL DAILY
Qty: 90 TABLET | Refills: 1 | Status: SHIPPED | OUTPATIENT
Start: 2020-08-31 | End: 2021-10-18

## 2020-10-01 RX ORDER — FLUTICASONE PROPIONATE 50 MCG
SPRAY, SUSPENSION (ML) NASAL
Qty: 16 G | OUTPATIENT
Start: 2020-10-01

## 2020-12-30 DIAGNOSIS — I25.10 ATHEROSCLEROSIS OF NATIVE CORONARY ARTERY OF NATIVE HEART, ANGINA PRESENCE UNSPECIFIED: Primary | ICD-10-CM

## 2021-01-15 ENCOUNTER — APPOINTMENT (OUTPATIENT)
Dept: NUCLEAR MEDICINE | Facility: HOSPITAL | Age: 51
End: 2021-01-15

## 2021-01-15 ENCOUNTER — HOSPITAL ENCOUNTER (OUTPATIENT)
Dept: CARDIOLOGY | Facility: HOSPITAL | Age: 51
Discharge: HOME | End: 2021-01-15

## 2021-01-15 NOTE — H&P
Cardiology History and Physical Note        Patient Name: Julia Gay  Age/Sex: 50 y.o. male  : 1970  MRN: [id number]    Date of Admission : 1/15/2021    Primary care Physician: Lucius Wilson MD    Reason for Admission: Lexiscan Cardiolite stress test with history of atherosclerotic coronary artery disease with previous coronary artery bypass grafting.    Subjective:       Chief Complaint: Atypical chest pain.    History of Present Illness:  Julia Gay is a 50 y.o. male    Height of 5 feet 8 inches weight of 150 pounds body mass index of 22 with a past medical history significant for atherosclerotic coronary artery disease with previous history of aborted inferior wall myocardial infarction in 2012 with rescue PTCA and stenting of the native circumflex artery with deployment of a 2.5 mm x 26 mm and a 3.0 mm x 26 mm resolute stent and a 2.75 mm x 12 mm resolute stent done at [facility] in , PTCA and stenting of the obtuse marginal branch with deployment of a 2.5 mm x 28 mm stent done in [place] in , PTCA and stenting of the obtuse marginal branch done with deployment of a 2.5 mm x 28 mm and a 2.5 mm x 15 mm Xience 3 drug-eluting stent done in 2012 at [facility] in [place], with subsequent coronary artery bypass grafting done at [place] with a LIMA to the LAD and a saphenous vein graft to the obtuse marginal branch and a saphenous vein graft to the right coronary artery, last coronary angiogram done which had revealed a 100% occlusion of the saphenous vein graft to the right coronary artery and 100% occlusion of the saphenous vein graft to the obtuse marginal branch and 100% occlusion of the distal right coronary artery and 100% occlusion of the native left anterior descending artery with a patent LIMA to the LAD, ischemic cardiomyopathy with left ventricular systolic dysfunction, history of arterial  hypertension, hypertensive heart disease, hyperlipidemia, anxiety depression, chronic obstructive lung disease, chronic back pain, restless leg syndrome, tobacco abuse, and family history for coronary artery disease.    Patient of note has continued to smoke.  Patient presented with symptoms of atypical chest pain.    Patient had undergone a last transthoracic echocardiogram which had revealed an ejection fraction of 35 to 40%.  Patient complains of having symptoms of atypical chest pain associated with lightheaded dizziness.  Patient complains of having symptoms of palpitation on and off.  Patient denies any nausea vomiting.  Patient denies any paroxysmal nocturnal dyspnea orthopnea.    Patient blood pressure today is 120/70 pulse of 57 respiration of 18 oxygen saturation of 97%.    Patient 10 point review of system except for stated in the history of present illness and negative.    Concurrent Medical History:  1.  Atypical chest pain with lightheaded dizziness and symptomatic palpitation.  2.  Ischemic cardiomyopathy with left ventricular systolic dysfunction with an ejection fraction of 35 to 40%.  3.  Atherosclerotic coronary artery disease.  4.  Last coronary angiogram done had revealed:  A.  100% occlusion of the native right coronary artery distally with 100% occlusion of the saphenous vein graft to the right coronary artery.  B.  Diffusely diseased circumflex artery with 100% occlusion of the saphenous vein graft to the obtuse marginal branch.  C.  100% occlusion of the left anterior descending artery with a patent LIMA to the LAD.  5.  Aborted inferior wall myocardial infarction in January 2012.  6.  PTCA and stenting of the native circumflex artery with deployment of a 2.5 mm x 26 mm and a 3.0 mm x 26 mm resolute stent and a 2.75 mm x 12 mm resolute stent done at [facility] in January 2012.  7.  PTCA and stenting of the obtuse marginal branch with deployment of a 2.5 mm x 28 mm  stent done at [facility] in 2012.  8.  PTCA and stenting of the obtuse marginal branch with deployment of a 2.5 mm x 28 mm and 2.5 mm x 15 mm Xience V drug-eluting stent done in January 2013 at [facility].  8.  Coronary artery bypass grafting done at [facility] with:  A.  LIMA to the LAD.  B.  Saphenous vein graft to the obtuse marginal branch.  C.  Saphenous vein graft to the right coronary artery.  9.  Arterial hypertension.  10.  Hypertensive heart disease.  11.  Hyperlipidemia.  12.  Anxiety and depression.  13.  Chronic obstructive lung disease with tobacco abuse.  14.  Chronic back pain.  15.  Restless leg syndrome.  16.  Symptomatic nasal obstruction secondary to septal deformity.      Past Surgical History:  1.  Coronary angiogram with multiple PTCA and stenting.  2.  Coronary artery bypass grafting done at [facility] with:  A.  LIMA to the LAD.  B.  Saphenous vein graft to the obtuse marginal branch.  C.  Saphenous vein graft to the right coronary artery.  3.  Hernia repair.  4.  Endoscopic sinus surgery done in November 2018.  5.  Cauterization of the nasal bleeder.          Family History: Family history significant for father mother and brother who had coronary artery disease.      Social History: Significant for 1 pack/day tobacco abuse denies any alcohol intake.       Cardiac Risk factor:     1. Male   2. Tobacco abuse   3. Arterial hypertension   4. Hyperlipidemia   5. Family history of CAD     Allergies:  Allergies   Allergen Reactions   • Penicillins Swelling     Facial swelling       Home Medication::  Prior to Admission medications    Medication Sig Start Date End Date Taking? Authorizing Provider   albuterol sulfate  (90 Base) MCG/ACT inhaler Inhale 2 puffs Every 4 (Four) Hours As Needed for Wheezing. 5/17/19   Lucius Wilson MD   aspirin 81 MG EC tablet Take 1 tablet by mouth Daily. 5/17/19   Lucius Wilson MD   atorvastatin (LIPITOR) 20 MG  tablet Take 1 tablet by mouth Every Night. at bedtime; Indication: cholesterol 2/17/20   Lucius Wilson MD   baclofen (LIORESAL) 10 MG tablet  1/20/20   ProviderJeannette MD   carvedilol (COREG) 3.125 MG tablet Take 1 tablet by mouth 2 (Two) Times a Day With Meals. 2/17/20   Lucius Wilson MD   citalopram (CeleXA) 40 MG tablet TAKE 1 TABLET BY MOUTH DAILY 8/31/20   Lucius Wilson MD   gabapentin (NEURONTIN) 300 MG capsule Take 300 mg by mouth daily.    Jeannette Pierre MD   HYDROcodone-acetaminophen (NORCO)  MG per tablet Take 1 tablet by mouth Every 6 (Six) Hours As Needed for Moderate Pain .    Jeannette Pierre MD   meloxicam (MOBIC) 15 MG tablet Take 15 mg by mouth Daily.    Jeannette Pierre MD   prasugrel (EFFIENT) 10 MG tablet Take 10 mg by mouth Daily.    Jeannette Pierre MD         Review of Systems   Constitutional: Positive for fatigue. Negative for chills, fever and unexpected weight change.   HENT: Negative for hearing loss and nosebleeds.    Eyes: Negative for visual disturbance.   Respiratory: Positive for chest tightness and shortness of breath. Negative for cough and wheezing.    Cardiovascular: Positive for palpitations. Negative for chest pain and leg swelling.   Gastrointestinal: Negative for abdominal pain, blood in stool, constipation, diarrhea, nausea and vomiting.   Endocrine: Negative for cold intolerance, heat intolerance, polydipsia, polyphagia and polyuria.   Genitourinary: Negative for hematuria.   Musculoskeletal: Negative for joint swelling, myalgias and neck pain.   Skin: Negative for color change, rash and wound.   Neurological: Positive for light-headedness. Negative for dizziness, seizures, syncope, numbness and headaches.   Hematological: Does not bruise/bleed easily.         Objective:     There were no vitals taken for this visit.  Blood pressure 120/70 pulse of 57 regular respiration of 18 oxygen saturation of 97%  Constitutional:       Appearance:  Well-developed.   Eyes:      General: No scleral icterus.     Conjunctiva/sclera: Conjunctivae normal.      Pupils: Pupils are equal, round, and reactive to light.   HENT:      Head: Normocephalic and atraumatic.      Left Ear: External ear normal.      Nose: Nose normal.   Neck:      Musculoskeletal: Normal range of motion and neck supple.      Thyroid: No thyromegaly.      Vascular: No JVD.      Trachea: No tracheal deviation.      Lymphadenopathy: No cervical adenopathy.   Pulmonary:      Breath sounds: Normal breath sounds. No stridor.   Cardiovascular:      Normal rate. Regular rhythm.   Abdominal:      General: Bowel sounds are normal.   Musculoskeletal: Normal range of motion.   Skin:     General: Skin is warm and dry.   Neurological:      Mental Status: Alert and oriented to person, place, and time.      Deep Tendon Reflexes: Reflexes are normal and symmetric.   Psychiatric:         Behavior: Behavior normal.         Thought Content: Thought content normal.         Judgment: Judgment normal.         Lab Review:           Invalid input(s): LABALBU, PROT                                    EKG:   ECG/EMG Results (last 24 hours)     ** No results found for the last 24 hours. **          Imaging:  Imaging Results (Last 24 Hours)     ** No results found for the last 24 hours. **          I personally viewed and interpreted the patient's EKG/Telemetry data.    Assessment:   1.  Atypical chest tightness with symptoms of palpitation lightheaded dizziness.  2.  Atherosclerotic coronary artery disease with previous coronary artery bypass grafting.  3.  Arterial hypertension.  4.  Ischemic cardiomyopathy.  5.  Hyperlipidemia.  6.  Chronic obstructive lung disease with tobacco abuse.          Plan:   1.  Atypical chest tightness.  Patient does have history of documented atherosclerotic coronary artery disease with previous aborted inferior wall myocardial infarction in 2012 with multiple coronary intervention to the  circumflex artery.  Patient last coronary angiogram done had revealed 100% occlusion of the saphenous vein graft to the right coronary artery and 100% occlusion of the obtuse marginal branch bypass graft with 100% occlusion of the left anterior descending artery with a patent LIMA to the LAD.  Patient has been recommended to undergo a Lexiscan Cardiolite stress test.  Risk-benefit treatment option for the Lexiscan Cardiolite stress test were discussed with the patient and an informed consent was obtained.  Patient has been recommended to continue with the present dose of the Ranexa, aspirin and Effient.  Patient had not been able to tolerate isosorbide secondary to headache.    2.  Ischemic cardiomyopathy.  Patient last echocardiogram had revealed left ventricular systolic dysfunction with an ejection fraction of 35%.  Patient has been counseled to decrease her salt intake.  Patient would be continued on the present dose of the carvedilol.  Have discussed with the patient consideration for afterload reduction but the patient blood pressure has been on the low side.  Patient would be started on a low-dose of the losartan for afterload reduction.  Patient has been counseled to decrease her salt intake.  Patient has been recommended to undergo a repeat transthoracic echocardiogram to evaluate the left ventricular systolic function.    3.  Symptoms of palpitation.  Patient resting heart rate is 57 bpm.  Have discussed with the patient if the patient repeat transthoracic echocardiogram revealed left ventricular systolic dysfunction patient may need further evaluation with a event monitor to rule out any tachyarrhythmia.  Patient has been counseled to abstain from coffee and caffeinated beverage and to continue with the present dose of the carvedilol.  Patient has been recommended to take magnesium oxide 40 mg twice a day.    4.  Hyperlipidemia.  Patient has been counseled on low-fat low-cholesterol diet and to continue  with the present dose of the Lipitor.  Patient is to undergo lipid profile including liver function test evaluation.    5.  Chronic back pain.  Patient is currently on Neurontin and meloxicam.    6.  Risk factor modification.  Patient has been counseled to quit smoking.  Patient has been explained the risk associated with smoking and the occurrence and progression of atherosclerotic coronary artery disease and peripheral vascular disease.    7.  Restless leg syndrome.  Patient was on Requip.  Patient has not had any recent episode of restless leg.    The above plan and management was discussed with the patient and further recommendation will follow after the Lexiscan Cardiolite stress test and the echocardiogram        Time: time spent in face-to-face evaluation of greater than 55 minutes and interacting and formulating examining and discussing the plan with the patient with 50% of greater time spent in face-to-face interaction.    Electronically signed by Nathan Malik MD, 01/15/21, 7:31 AM CST.      Dictated utilizing Dragon dictation.

## 2021-02-11 ENCOUNTER — APPOINTMENT (OUTPATIENT)
Dept: NUCLEAR MEDICINE | Facility: HOSPITAL | Age: 51
End: 2021-02-11

## 2021-02-11 ENCOUNTER — HOSPITAL ENCOUNTER (OUTPATIENT)
Dept: CARDIOLOGY | Facility: HOSPITAL | Age: 51
Discharge: HOME | End: 2021-02-11

## 2021-03-17 DIAGNOSIS — I25.10 CAD IN NATIVE ARTERY: Chronic | ICD-10-CM

## 2021-03-17 RX ORDER — ATORVASTATIN CALCIUM 20 MG/1
TABLET, FILM COATED ORAL
Qty: 90 TABLET | Refills: 3 | OUTPATIENT
Start: 2021-03-17

## 2021-03-19 ENCOUNTER — APPOINTMENT (OUTPATIENT)
Dept: VACCINE CLINIC | Facility: HOSPITAL | Age: 51
End: 2021-03-19

## 2021-04-09 ENCOUNTER — APPOINTMENT (OUTPATIENT)
Dept: VACCINE CLINIC | Facility: HOSPITAL | Age: 51
End: 2021-04-09

## 2021-04-12 DIAGNOSIS — G89.29 CHRONIC BILATERAL LOW BACK PAIN WITHOUT SCIATICA: Chronic | ICD-10-CM

## 2021-04-12 DIAGNOSIS — M54.50 CHRONIC BILATERAL LOW BACK PAIN WITHOUT SCIATICA: Chronic | ICD-10-CM

## 2021-04-12 RX ORDER — CITALOPRAM 40 MG/1
40 TABLET ORAL DAILY
Qty: 90 TABLET | Refills: 1 | OUTPATIENT
Start: 2021-04-12

## 2021-04-22 ENCOUNTER — HOSPITAL ENCOUNTER (OUTPATIENT)
Dept: CARDIOLOGY | Facility: HOSPITAL | Age: 51
Discharge: HOME | End: 2021-04-22

## 2021-04-22 ENCOUNTER — HOSPITAL ENCOUNTER (OUTPATIENT)
Dept: NUCLEAR MEDICINE | Facility: HOSPITAL | Age: 51
Discharge: HOME | End: 2021-04-22

## 2021-04-22 ENCOUNTER — APPOINTMENT (OUTPATIENT)
Dept: NUCLEAR MEDICINE | Facility: HOSPITAL | Age: 51
End: 2021-04-22

## 2021-04-22 DIAGNOSIS — I25.10 ATHEROSCLEROSIS OF NATIVE CORONARY ARTERY OF NATIVE HEART, ANGINA PRESENCE UNSPECIFIED: ICD-10-CM

## 2021-04-22 DIAGNOSIS — Z98.61 HISTORY OF PTCA: ICD-10-CM

## 2021-04-22 DIAGNOSIS — R07.9 CHEST PAIN, UNSPECIFIED TYPE: ICD-10-CM

## 2021-04-22 PROCEDURE — 78452 HT MUSCLE IMAGE SPECT MULT: CPT

## 2021-04-22 PROCEDURE — 0 TECHNETIUM SESTAMIBI: Performed by: INTERNAL MEDICINE

## 2021-04-22 PROCEDURE — A9500 TC99M SESTAMIBI: HCPCS | Performed by: INTERNAL MEDICINE

## 2021-04-22 PROCEDURE — 93017 CV STRESS TEST TRACING ONLY: CPT

## 2021-04-22 RX ADMIN — TECHNETIUM TC 99M SESTAMIBI 1 DOSE: 1 INJECTION INTRAVENOUS at 11:13

## 2021-04-22 NOTE — H&P
Cardiology History and Physical Note        Patient Name: Julia Gay  Age/Sex: 51 y.o. male  : 1970  MRN: [id number]    Date of Admission : 2021    Primary care Physician: Lucius Wilson MD    Reason for Admission:  Lexiscan Cardiolite stress test with history of atherosclerotic coronary artery disease with previous coronary artery bypass grafting      Subjective:       Chief Complaint: Atypical chest pain.    History of Present Illness:  Julia Gay is a 51 y.o. male      Height of 5 feet 8 inches weight of 150 pounds body mass index of 22 with a past medical history significant for atherosclerotic coronary artery disease with previous history of aborted inferior wall myocardial infarction in 2012 with rescue PTCA and stenting of the native circumflex artery with deployment of a 2.5 mm x 26 mm and a 3.0 mm x 26 mm resolute stent and a 2.75 mm x 12 mm resolute stent done at [facility] in , PTCA and stenting of the obtuse marginal branch with deployment of a 2.5 mm x 28 mm stent done in [facility] in , PTCA and stenting of the obtuse marginal branch done with deployment of a 2.5 mm x 28 mm and a 2.5 mm x 15 mm Xience 3 drug-eluting stent done in 2012 at [facility] in [place], with subsequent coronary artery bypass grafting done at [facility] with a LIMA to the LAD and a saphenous vein graft to the obtuse marginal branch and a saphenous vein graft to the right coronary artery, last coronary angiogram done which had revealed a 100% occlusion of the saphenous vein graft to the right coronary artery and 100% occlusion of the saphenous vein graft to the obtuse marginal branch and 100% occlusion of the distal right coronary artery and 100% occlusion of the native left anterior descending artery with a patent LIMA to the LAD, ischemic cardiomyopathy with left ventricular systolic dysfunction, history of arterial  hypertension, hypertensive heart disease, hyperlipidemia, anxiety depression, chronic obstructive lung disease, chronic back pain, restless leg syndrome, tobacco abuse, and family history for coronary artery disease.     Patient of note has continued to smoke.  Patient presented with symptoms of atypical chest pain.     Patient had undergone a last transthoracic echocardiogram which had revealed an ejection fraction of 35 to 40%.  Patient complains of having symptoms of atypical chest pain associated with lightheaded dizziness.  Patient complains of having symptoms of palpitation on and off.  Patient denies any nausea vomiting.  Patient denies any paroxysmal nocturnal dyspnea orthopnea.     Patient blood pressure today is 120/70 pulse of 57 respiration of 18 oxygen saturation of 97%.     Patient 10 point review of system except for stated in the history of present illness and negative.     Concurrent Medical History:  1.  Atypical chest pain with lightheaded dizziness and symptomatic palpitation.  2.  Ischemic cardiomyopathy with left ventricular systolic dysfunction with an ejection fraction of 35 to 40%.  3.  Atherosclerotic coronary artery disease.  4.  Last coronary angiogram done had revealed:  A.  100% occlusion of the native right coronary artery distally with 100% occlusion of the saphenous vein graft to the right coronary artery.  B.  Diffusely diseased circumflex artery with 100% occlusion of the saphenous vein graft to the obtuse marginal branch.  C.  100% occlusion of the left anterior descending artery with a patent LIMA to the LAD.  5.  Aborted inferior wall myocardial infarction in January 2012.  6.  PTCA and stenting of the native circumflex artery with deployment of a 2.5 mm x 26 mm and a 3.0 mm x 26 mm resolute stent and a 2.75 mm x 12 mm resolute stent done at [facility] in January 2012.  7.  PTCA and stenting of the obtuse marginal branch with deployment of a 2.5 mm x 28  mm stent done at [facility] in 2012.  8.  PTCA and stenting of the obtuse marginal branch with deployment of a 2.5 mm x 28 mm and 2.5 mm x 15 mm Xience V drug-eluting stent done in January 2013 at [facility].  8.  Coronary artery bypass grafting done at [facility] with:  A.  LIMA to the LAD.  B.  Saphenous vein graft to the obtuse marginal branch.  C.  Saphenous vein graft to the right coronary artery.  9.  Arterial hypertension.  10.  Hypertensive heart disease.  11.  Hyperlipidemia.  12.  Anxiety and depression.  13.  Chronic obstructive lung disease with tobacco abuse.  14.  Chronic back pain.  15.  Restless leg syndrome.  16.  Symptomatic nasal obstruction secondary to septal deformity.        Past Surgical History:  1.  Coronary angiogram with multiple PTCA and stenting.  2.  Coronary artery bypass grafting done at [facility] with:  A.  LIMA to the LAD.  B.  Saphenous vein graft to the obtuse marginal branch.  C.  Saphenous vein graft to the right coronary artery.  3.  Hernia repair.  4.  Endoscopic sinus surgery done in November 2018.  5.  Cauterization of the nasal bleeder.              Family History: Family history significant for father mother and brother who had coronary artery disease.        Social History: Significant for 1 pack/day tobacco abuse denies any alcohol intake.        Cardiac Risk factor:      1. Male   2. Tobacco abuse   3. Arterial hypertension   4. Hyperlipidemia   5. Family history of CAD           Allergies:  Allergies   Allergen Reactions   • Penicillins Swelling     Facial swelling       Home Medication::  Prior to Admission medications    Medication Sig Start Date End Date Taking? Authorizing Provider   albuterol sulfate  (90 Base) MCG/ACT inhaler Inhale 2 puffs Every 4 (Four) Hours As Needed for Wheezing. 5/17/19   Lucius Wilson MD   aspirin 81 MG EC tablet Take 1 tablet by mouth Daily. 5/17/19   Lucius iWlson MD   atorvastatin  (LIPITOR) 20 MG tablet Take 1 tablet by mouth Every Night. at bedtime; Indication: cholesterol 2/17/20   Lucius Wilson MD   baclofen (LIORESAL) 10 MG tablet  1/20/20   ProviderJeannette MD   carvedilol (COREG) 3.125 MG tablet Take 1 tablet by mouth 2 (Two) Times a Day With Meals. 2/17/20   Lucius Wilson MD   citalopram (CeleXA) 40 MG tablet TAKE 1 TABLET BY MOUTH DAILY 8/31/20   Lucius Wilson MD   gabapentin (NEURONTIN) 300 MG capsule Take 300 mg by mouth daily.    ProviderJeannette MD   HYDROcodone-acetaminophen (NORCO)  MG per tablet Take 1 tablet by mouth Every 6 (Six) Hours As Needed for Moderate Pain .    Jeannette Pierre MD   meloxicam (MOBIC) 15 MG tablet Take 15 mg by mouth Daily.    Jeannette Pierre MD   prasugrel (EFFIENT) 10 MG tablet Take 10 mg by mouth Daily.    ProviderJeannette MD         Review of Systems   Constitutional: Negative for chills, fever and unexpected weight change.   HENT: Negative for hearing loss and nosebleeds.    Eyes: Negative for visual disturbance.   Respiratory: Positive for chest tightness and shortness of breath. Negative for cough and wheezing.    Cardiovascular: Positive for chest pain and palpitations. Negative for leg swelling.   Gastrointestinal: Negative for abdominal pain, blood in stool, constipation, diarrhea, nausea and vomiting.   Endocrine: Negative for cold intolerance, heat intolerance, polydipsia, polyphagia and polyuria.   Genitourinary: Negative for hematuria.   Musculoskeletal: Negative for joint swelling, myalgias and neck pain.   Skin: Negative for color change, rash and wound.   Neurological: Negative for dizziness, seizures, syncope, light-headedness, numbness and headaches.   Hematological: Does not bruise/bleed easily.         Objective:     There were no vitals taken for this visit.  Blood pressure 120/70 pulse of 57 regular respiration of 18 oxygen saturation of 97%  Constitutional:       Appearance: Well-developed.    Eyes:      General: No scleral icterus.     Conjunctiva/sclera: Conjunctivae normal.      Pupils: Pupils are equal, round, and reactive to light.   HENT:      Head: Normocephalic and atraumatic.      Left Ear: External ear normal.      Nose: Nose normal.   Neck:      Thyroid: No thyromegaly.      Vascular: No JVD.      Trachea: No tracheal deviation.      Lymphadenopathy: No cervical adenopathy.   Pulmonary:      Breath sounds: Normal breath sounds. No stridor.   Cardiovascular:      Normal rate. Regular rhythm.   Abdominal:      General: Bowel sounds are normal.   Musculoskeletal: Normal range of motion.      Cervical back: Normal range of motion and neck supple. Skin:     General: Skin is warm and dry.   Neurological:      Mental Status: Alert and oriented to person, place, and time.      Deep Tendon Reflexes: Reflexes are normal and symmetric.   Psychiatric:         Behavior: Behavior normal.         Thought Content: Thought content normal.         Judgment: Judgment normal.         Lab Review:           Invalid input(s): LABALBU, PROT                                    EKG:   ECG/EMG Results (last 24 hours)     ** No results found for the last 24 hours. **          Imaging:  Imaging Results (Last 24 Hours)     ** No results found for the last 24 hours. **          I personally viewed and interpreted the patient's EKG/Telemetry data.    Assessment:     1.  Atypical chest tightness with symptoms of palpitation lightheaded dizziness.  2.  Atherosclerotic coronary artery disease with previous coronary artery bypass grafting.  3.  Arterial hypertension.  4.  Ischemic cardiomyopathy.  5.  Hyperlipidemia.  6.  Chronic obstructive lung disease with tobacco abuse.        Plan:   1.  Atypical chest tightness.  Patient does have history of documented atherosclerotic coronary artery disease with previous aborted inferior wall myocardial infarction in 2012 with multiple coronary intervention to the circumflex artery.   Patient last coronary angiogram done had revealed 100% occlusion of the saphenous vein graft to the right coronary artery and 100% occlusion of the obtuse marginal branch bypass graft with 100% occlusion of the left anterior descending artery with a patent LIMA to the LAD.  Patient has been recommended to undergo a Lexiscan Cardiolite stress test.  Risk-benefit treatment option for the Lexiscan Cardiolite stress test were discussed with the patient and an informed consent was obtained.  Patient has been recommended to continue with the present dose of the Ranexa, aspirin and Effient.  Patient had not been able to tolerate isosorbide secondary to headache.     2.  Ischemic cardiomyopathy.  Patient last echocardiogram had revealed left ventricular systolic dysfunction with an ejection fraction of 35%.  Patient has been counseled to decrease her salt intake.  Patient would be continued on the present dose of the carvedilol.  Have discussed with the patient consideration for afterload reduction but the patient blood pressure has been on the low side.  Patient would be started on a low-dose of the losartan for afterload reduction.  Patient has been counseled to decrease her salt intake.  Patient has been recommended to undergo a repeat transthoracic echocardiogram to evaluate the left ventricular systolic function.     3.  Symptoms of palpitation.  Patient resting heart rate is 57 bpm.  Have discussed with the patient if the patient repeat transthoracic echocardiogram revealed left ventricular systolic dysfunction patient may need further evaluation with a event monitor to rule out any tachyarrhythmia.  Patient has been counseled to abstain from coffee and caffeinated beverage and to continue with the present dose of the carvedilol.  Patient has been recommended to take magnesium oxide 40 mg twice a day.     4.  Hyperlipidemia.  Patient has been counseled on low-fat low-cholesterol diet and to continue with the present  dose of the Lipitor.  Patient is to undergo lipid profile including liver function test evaluation.     5.  Chronic back pain.  Patient is currently on Neurontin and meloxicam.     6.  Risk factor modification.  Patient has been counseled to quit smoking.  Patient has been explained the risk associated with smoking and the occurrence and progression of atherosclerotic coronary artery disease and peripheral vascular disease.     7.  Restless leg syndrome.  Patient was on Requip.  Patient has not had any recent episode of restless leg.     The above plan and management was discussed with the patient and further recommendation will follow after the Lexiscan Cardiolite stress test and the echocardiogram        Time: time spent in face-to-face evaluation of greater than 55 minutes and interacting and formulating examining and discussing the plan with the patient with 50% of greater time spent in face-to-face interaction.    Electronically signed by Nathan Malik MD, 04/22/21, 8:55 AM CDT.    Dictated utilizing Dragon dictation.

## 2021-04-27 ENCOUNTER — HOSPITAL ENCOUNTER (OUTPATIENT)
Dept: CARDIOLOGY | Facility: HOSPITAL | Age: 51
Discharge: HOME | End: 2021-04-27

## 2021-04-27 ENCOUNTER — HOSPITAL ENCOUNTER (OUTPATIENT)
Dept: NUCLEAR MEDICINE | Facility: HOSPITAL | Age: 51
Discharge: HOME | End: 2021-04-27

## 2021-04-27 PROCEDURE — 25010000002 REGADENOSON 0.4 MG/5ML SOLUTION: Performed by: INTERNAL MEDICINE

## 2021-04-27 PROCEDURE — A9500 TC99M SESTAMIBI: HCPCS | Performed by: INTERNAL MEDICINE

## 2021-04-27 PROCEDURE — 0 TECHNETIUM SESTAMIBI: Performed by: INTERNAL MEDICINE

## 2021-04-27 RX ORDER — SODIUM CHLORIDE 0.9 % (FLUSH) 0.9 %
10 SYRINGE (ML) INJECTION ONCE
Status: COMPLETED | OUTPATIENT
Start: 2021-04-27 | End: 2021-04-27

## 2021-04-27 RX ADMIN — TECHNETIUM TC 99M SESTAMIBI 1 DOSE: 1 INJECTION INTRAVENOUS at 08:26

## 2021-04-27 RX ADMIN — REGADENOSON 0.4 MG: 0.08 INJECTION, SOLUTION INTRAVENOUS at 08:26

## 2021-04-27 RX ADMIN — SODIUM CHLORIDE, PRESERVATIVE FREE 10 ML: 5 INJECTION INTRAVENOUS at 08:26

## 2021-04-30 LAB
BH CV REST NUCLEAR ISOTOPE DOSE: 32.3 MCI
BH CV STRESS BP STAGE 1: NORMAL
BH CV STRESS COMMENTS STAGE 1: NORMAL
BH CV STRESS DOSE REGADENOSON STAGE 1: 0.4
BH CV STRESS DURATION MIN STAGE 1: 0
BH CV STRESS DURATION SEC STAGE 1: 10
BH CV STRESS HR STAGE 1: 75
BH CV STRESS NUCLEAR ISOTOPE DOSE: 35 MCI
BH CV STRESS PROTOCOL 1: NORMAL
BH CV STRESS RECOVERY BP: NORMAL MMHG
BH CV STRESS RECOVERY HR: 72 BPM
BH CV STRESS STAGE 1: 1
LV EF NUC BP: 24 %
MAXIMAL PREDICTED HEART RATE: 167 BPM
PERCENT MAX PREDICTED HR: 51.5 %
STRESS BASELINE BP: NORMAL MMHG
STRESS BASELINE HR: 50 BPM
STRESS PERCENT HR: 61 %
STRESS POST ESTIMATED WORKLOAD: 1 METS
STRESS POST PEAK BP: NORMAL MMHG
STRESS POST PEAK HR: 86 BPM
STRESS TARGET HR: 142 BPM

## 2021-10-18 ENCOUNTER — OFFICE VISIT (OUTPATIENT)
Dept: FAMILY MEDICINE CLINIC | Facility: CLINIC | Age: 51
End: 2021-10-18

## 2021-10-18 VITALS
SYSTOLIC BLOOD PRESSURE: 130 MMHG | DIASTOLIC BLOOD PRESSURE: 80 MMHG | HEART RATE: 78 BPM | OXYGEN SATURATION: 97 % | BODY MASS INDEX: 21.05 KG/M2 | HEIGHT: 68 IN | WEIGHT: 138.9 LBS

## 2021-10-18 DIAGNOSIS — M54.50 CHRONIC BILATERAL LOW BACK PAIN WITHOUT SCIATICA: Primary | ICD-10-CM

## 2021-10-18 DIAGNOSIS — J30.2 SEASONAL ALLERGIES: ICD-10-CM

## 2021-10-18 DIAGNOSIS — Z72.0 TOBACCO USE: ICD-10-CM

## 2021-10-18 DIAGNOSIS — J44.9 COPD, MILD (HCC): ICD-10-CM

## 2021-10-18 DIAGNOSIS — G89.29 CHRONIC BILATERAL LOW BACK PAIN WITHOUT SCIATICA: Primary | ICD-10-CM

## 2021-10-18 DIAGNOSIS — K21.00 GASTROESOPHAGEAL REFLUX DISEASE WITH ESOPHAGITIS, UNSPECIFIED WHETHER HEMORRHAGE: ICD-10-CM

## 2021-10-18 PROCEDURE — 99213 OFFICE O/P EST LOW 20 MIN: CPT | Performed by: STUDENT IN AN ORGANIZED HEALTH CARE EDUCATION/TRAINING PROGRAM

## 2021-10-18 RX ORDER — MELOXICAM 15 MG/1
15 TABLET ORAL DAILY
Qty: 30 TABLET | Refills: 2 | Status: SHIPPED | OUTPATIENT
Start: 2021-10-18 | End: 2022-01-21

## 2021-10-18 RX ORDER — FLUTICASONE PROPIONATE 50 MCG
2 SPRAY, SUSPENSION (ML) NASAL DAILY
Qty: 18.2 ML | Refills: 2 | Status: SHIPPED | OUTPATIENT
Start: 2021-10-18 | End: 2022-01-25

## 2021-10-18 RX ORDER — OXYCODONE AND ACETAMINOPHEN 7.5; 325 MG/1; MG/1
TABLET ORAL
COMMUNITY
Start: 2021-09-18

## 2021-10-18 RX ORDER — DULOXETIN HYDROCHLORIDE 30 MG/1
30 CAPSULE, DELAYED RELEASE ORAL DAILY
Qty: 30 CAPSULE | Refills: 0 | Status: SHIPPED | OUTPATIENT
Start: 2021-10-18 | End: 2021-11-22

## 2021-10-18 RX ORDER — ROPINIROLE 2 MG/1
2 TABLET, FILM COATED ORAL NIGHTLY
Qty: 30 TABLET | Refills: 1 | Status: SHIPPED | OUTPATIENT
Start: 2021-10-18 | End: 2022-02-14 | Stop reason: SDUPTHER

## 2021-10-18 RX ORDER — GABAPENTIN 600 MG/1
TABLET ORAL
COMMUNITY
Start: 2021-09-25

## 2021-10-18 RX ORDER — ALBUTEROL SULFATE 90 UG/1
2 AEROSOL, METERED RESPIRATORY (INHALATION) EVERY 4 HOURS PRN
Qty: 18 G | Refills: 11 | Status: SHIPPED | OUTPATIENT
Start: 2021-10-18 | End: 2022-12-08

## 2021-10-18 RX ORDER — OMEPRAZOLE 40 MG/1
40 CAPSULE, DELAYED RELEASE ORAL DAILY
Qty: 30 CAPSULE | Refills: 2 | Status: SHIPPED | OUTPATIENT
Start: 2021-10-18 | End: 2022-01-21

## 2021-11-02 NOTE — PROGRESS NOTES
Family Medicine Residency  Yan Harrington MD    Subjective:     Julia Gay is a 51 y.o. male who presents to establish care and hip pain.    Patient was a previous Lucius Wilson patient and would like to establish care at our clinic.  His main issue at this time is his b/l hip pain.  Has not had imaging and has not been referred to ortho.  Patient also needs some refills.    The following portions of the patient's history were reviewed and updated as appropriate: allergies, current medications, past family history, past medical history, past social history, past surgical history and problem list.    Past Medical Hx:  Past Medical History:   Diagnosis Date   • Acute bronchitis    • Astigmatism    • Atherosclerotic heart disease of native coronary artery without angina pectoris    • Backache    • Benign hypertension    • Common cold    • Cough    • Dyspnea    • Folliculitis    • GERD (gastroesophageal reflux disease)    • History of echocardiogram 03/20/2012    Echocardiogram W/O color flow 08446 (1) - LV dilstation with mild LV dysfunction with EF 45%. Septal hypokinesis. Diastolic relaxation abnormality of left ventricle. Mild mitral and tricuspid regurgitation w/mild pulmonary insufficiency.   • Myopia    • Tobacco dependence syndrome        Past Surgical Hx:  Past Surgical History:   Procedure Laterality Date   • CARDIAC SURGERY  2000    cabg   • CAUTERIZATION NASAL BLEEDERS N/A 12/21/2018    Procedure: CAUTERIZE NASAL BLEEDERS;  Surgeon: Som Redd MD;  Location: [facility];  Service: ENT   • CORONARY ANGIOPLASTY WITH STENT PLACEMENT  2013    STENTS X 5   • ENDOSCOPIC FUNCTIONAL SINUS SURGERY (FESS) Bilateral 11/27/2018    Procedure: NASAL SEPTAL REPAIR, BILATERAL PARTIAL INFERIOR TURBINECTOMIES, BILATERAL FRONTAL ENDOSCOPIC SINUS SURGERY, RIGHT ENDOSCOPIC ETHMOID SINUS SURGERY, RIGHT ENDOSCOPIC BILATERAL SINUS SURGERY;  Surgeon: Som Redd MD;  Location: [facility];  Service: ENT   • HERNIA  REPAIR      x 2       Current Meds:    Current Outpatient Medications:   •  albuterol sulfate  (90 Base) MCG/ACT inhaler, Inhale 2 puffs Every 4 (Four) Hours As Needed for Wheezing., Disp: 18 g, Rfl: 11  •  aspirin 81 MG EC tablet, Take 1 tablet by mouth Daily., Disp: 90 tablet, Rfl: 3  •  atorvastatin (LIPITOR) 20 MG tablet, Take 1 tablet by mouth Every Night. at bedtime; Indication: cholesterol, Disp: 90 tablet, Rfl: 3  •  baclofen (LIORESAL) 10 MG tablet, , Disp: , Rfl:   •  carvedilol (COREG) 3.125 MG tablet, Take 1 tablet by mouth 2 (Two) Times a Day With Meals., Disp: 180 tablet, Rfl: 3  •  gabapentin (NEURONTIN) 300 MG capsule, Take 300 mg by mouth daily., Disp: , Rfl:   •  gabapentin (NEURONTIN) 600 MG tablet, , Disp: , Rfl:   •  HYDROcodone-acetaminophen (NORCO)  MG per tablet, Take 1 tablet by mouth Every 6 (Six) Hours As Needed for Moderate Pain ., Disp: , Rfl:   •  meloxicam (MOBIC) 15 MG tablet, Take 1 tablet by mouth Daily., Disp: 30 tablet, Rfl: 2  •  oxyCODONE-acetaminophen (PERCOCET) 7.5-325 MG per tablet, , Disp: , Rfl:   •  prasugrel (EFFIENT) 10 MG tablet, Take 10 mg by mouth Daily., Disp: , Rfl:   •  DULoxetine (Cymbalta) 30 MG capsule, Take 1 capsule by mouth Daily., Disp: 30 capsule, Rfl: 0  •  fluticasone (Flonase) 50 MCG/ACT nasal spray, 2 sprays into the nostril(s) as directed by provider Daily., Disp: 18.2 mL, Rfl: 2  •  omeprazole (priLOSEC) 40 MG capsule, Take 1 capsule by mouth Daily., Disp: 30 capsule, Rfl: 2  •  rOPINIRole (Requip) 2 MG tablet, Take 1 tablet by mouth Every Night., Disp: 30 tablet, Rfl: 1    Allergies:  Allergies   Allergen Reactions   • Penicillins Swelling     Facial swelling       Family Hx:  Family History   Problem Relation Age of Onset   • Heart disease Mother    • Hypertension Mother    • Arthritis Mother    • Heart disease Father    • Hypertension Father    • Arthritis Father    • Hypertension Sister    • Heart disease Sister    • Heart disease  "Brother    • Hypertension Brother         Social History:  Social History     Socioeconomic History   • Marital status:    Tobacco Use   • Smoking status: Current Every Day Smoker     Packs/day: 1.00     Years: 40.00     Pack years: 40.00   • Smokeless tobacco: Never Used   Substance and Sexual Activity   • Alcohol use: No   • Drug use: No   • Sexual activity: Defer     Comment: Marital status:        Review of Systems  Review of Systems   Constitutional: Negative for chills and fever.   HENT: Negative for congestion, rhinorrhea and sore throat.    Eyes: Negative for visual disturbance.   Respiratory: Negative for chest tightness and shortness of breath.    Cardiovascular: Negative for chest pain and palpitations.   Gastrointestinal: Negative for abdominal pain, diarrhea, nausea and vomiting.   Endocrine: Negative for polyuria.   Genitourinary: Negative for difficulty urinating and dysuria.   Musculoskeletal: Negative for back pain and myalgias.   Skin: Negative for rash and wound.   Neurological: Negative for dizziness, weakness and numbness.   Hematological: Does not bruise/bleed easily.   Psychiatric/Behavioral: Negative for agitation, behavioral problems and confusion.       Objective:     /80   Pulse 78   Ht 172.7 cm (68\")   Wt 63 kg (138 lb 14.4 oz)   SpO2 97%   BMI 21.12 kg/m²   Physical Exam  Constitutional:       General: He is not in acute distress.  HENT:      Head: Normocephalic and atraumatic.      Right Ear: External ear normal.      Left Ear: External ear normal.   Cardiovascular:      Rate and Rhythm: Normal rate and regular rhythm.      Heart sounds: Normal heart sounds.   Pulmonary:      Effort: Pulmonary effort is normal. No respiratory distress.      Breath sounds: Normal breath sounds.   Abdominal:      General: Bowel sounds are normal.      Palpations: Abdomen is soft.      Tenderness: There is no abdominal tenderness.   Musculoskeletal:         General: Normal range of " motion.      Right lower leg: No edema.      Left lower leg: No edema.      Comments: Pain with abduction of hips and pressure on acetabulums b/l.  Knee pain with  Grinding.   Skin:     General: Skin is warm and dry.   Neurological:      Mental Status: He is alert.      Comments: Moving all extremities   Psychiatric:         Mood and Affect: Mood normal.         Behavior: Behavior normal.          Assessment/Plan:     Diagnoses and all orders for this visit:    1. Chronic bilateral low back pain without sciatica (Primary)  As patient is new, will gather imaging.  Likely osteoarthritis.  May refer to ortho if hip pain is due to osteo for injections.    -     rOPINIRole (Requip) 2 MG tablet; Take 1 tablet by mouth Every Night.  Dispense: 30 tablet; Refill: 1  -     meloxicam (MOBIC) 15 MG tablet; Take 1 tablet by mouth Daily.  Dispense: 30 tablet; Refill: 2  -     DULoxetine (Cymbalta) 30 MG capsule; Take 1 capsule by mouth Daily.  Dispense: 30 capsule; Refill: 0  -     XR Hips Bilateral With or Without Pelvis 2 View; Future  -     XR knee 4+ vw bilateral; Future    2. Gastroesophageal reflux disease with esophagitis, unspecified whether hemorrhage  Stable. Refill.  -     omeprazole (priLOSEC) 40 MG capsule; Take 1 capsule by mouth Daily.  Dispense: 30 capsule; Refill: 2    3. Seasonal allergies  Stable.  Refill.  -     fluticasone (Flonase) 50 MCG/ACT nasal spray; 2 sprays into the nostril(s) as directed by provider Daily.  Dispense: 18.2 mL; Refill: 2    4. Tobacco use  Not interested in cessation at this time.  -     albuterol sulfate  (90 Base) MCG/ACT inhaler; Inhale 2 puffs Every 4 (Four) Hours As Needed for Wheezing.  Dispense: 18 g; Refill: 11    5. COPD, mild (HCC)  Stable respiratory status.  [place].  -     albuterol sulfate  (90 Base) MCG/ACT inhaler; Inhale 2 puffs Every 4 (Four) Hours As Needed for Wheezing.  Dispense: 18 g; Refill: 11        Follow-up:     1 month    Preventative:  Health  Maintenance   Topic Date Due   • COLORECTAL CANCER SCREENING  Never done   • LUNG CANCER SCREENING  Never done   • ANNUAL WELLNESS VISIT  02/17/2021   • INFLUENZA VACCINE  08/01/2021   • TDAP/TD VACCINES (2 - Td or Tdap) 06/06/2027   • Pneumococcal Vaccine 0-64 (2 of 2 - PPSV23) 04/13/2033   • HEPATITIS C SCREENING  Completed   • COVID-19 Vaccine  Completed   • ZOSTER VACCINE  Discontinued       RISK SCORE: 1      This document has been electronically signed by Yan Harrington MD on November 2, 2021 03:40 CDT

## 2021-11-08 NOTE — PROGRESS NOTES
Julia Gay  10/18/21  Subjective:     Julia Gay is a 51 y.o. male who presents for   Chief Complaint   Patient presents with   • Establish Care       51-year-old male with history of hypertension chronic pain and peripheral neuropathy presents today to establish with new PCP.  Patient continues with bilateral hip low back and lower extremity pain with radicular component he continues on gabapentin hydrocodone or oxycodone and duloxetine patient also has history of restless leg and continues on ropinirole.  See Dr. Whitfield's note for more detailed information regarding today's encounter physical exam findings and plan of care.        Past Medical Hx:  Past Medical History:   Diagnosis Date   • Acute bronchitis    • Astigmatism    • Atherosclerotic heart disease of native coronary artery without angina pectoris    • Backache    • Benign hypertension    • Common cold    • Cough    • Dyspnea    • Folliculitis    • GERD (gastroesophageal reflux disease)    • History of echocardiogram 03/20/2012    Echocardiogram W/O color flow 04654 (1) - LV dilstation with mild LV dysfunction with EF 45%. Septal hypokinesis. Diastolic relaxation abnormality of left ventricle. Mild mitral and tricuspid regurgitation w/mild pulmonary insufficiency.   • Myopia    • Tobacco dependence syndrome        Past Surgical Hx:  Past Surgical History:   Procedure Laterality Date   • CARDIAC SURGERY  2000    cabg   • CAUTERIZATION NASAL BLEEDERS N/A 12/21/2018    Procedure: CAUTERIZE NASAL BLEEDERS;  Surgeon: Som Redd MD;  Location: [facility];  Service: ENT   • CORONARY ANGIOPLASTY WITH STENT PLACEMENT  2013    STENTS X 5   • ENDOSCOPIC FUNCTIONAL SINUS SURGERY (FESS) Bilateral 11/27/2018    Procedure: NASAL SEPTAL REPAIR, BILATERAL PARTIAL INFERIOR TURBINECTOMIES, BILATERAL FRONTAL ENDOSCOPIC SINUS SURGERY, RIGHT ENDOSCOPIC ETHMOID SINUS SURGERY, RIGHT ENDOSCOPIC BILATERAL SINUS SURGERY;  Surgeon: Som Redd MD;   Location: [facility] OR;  Service: ENT   • HERNIA REPAIR      x 2       Health Maintenance:  Health Maintenance   Topic Date Due   • COLORECTAL CANCER SCREENING  Never done   • LUNG CANCER SCREENING  Never done   • ANNUAL WELLNESS VISIT  02/17/2021   • INFLUENZA VACCINE  08/01/2021   • TDAP/TD VACCINES (2 - Td or Tdap) 06/06/2027   • Pneumococcal Vaccine 0-64 (2 of 2 - PPSV23) 04/13/2033   • HEPATITIS C SCREENING  Completed   • COVID-19 Vaccine  Completed   • ZOSTER VACCINE  Discontinued       Current Meds:    Current Outpatient Medications:   •  albuterol sulfate  (90 Base) MCG/ACT inhaler, Inhale 2 puffs Every 4 (Four) Hours As Needed for Wheezing., Disp: 18 g, Rfl: 11  •  aspirin 81 MG EC tablet, Take 1 tablet by mouth Daily., Disp: 90 tablet, Rfl: 3  •  atorvastatin (LIPITOR) 20 MG tablet, Take 1 tablet by mouth Every Night. at bedtime; Indication: cholesterol, Disp: 90 tablet, Rfl: 3  •  baclofen (LIORESAL) 10 MG tablet, , Disp: , Rfl:   •  carvedilol (COREG) 3.125 MG tablet, Take 1 tablet by mouth 2 (Two) Times a Day With Meals., Disp: 180 tablet, Rfl: 3  •  gabapentin (NEURONTIN) 300 MG capsule, Take 300 mg by mouth daily., Disp: , Rfl:   •  gabapentin (NEURONTIN) 600 MG tablet, , Disp: , Rfl:   •  HYDROcodone-acetaminophen (NORCO)  MG per tablet, Take 1 tablet by mouth Every 6 (Six) Hours As Needed for Moderate Pain ., Disp: , Rfl:   •  meloxicam (MOBIC) 15 MG tablet, Take 1 tablet by mouth Daily., Disp: 30 tablet, Rfl: 2  •  oxyCODONE-acetaminophen (PERCOCET) 7.5-325 MG per tablet, , Disp: , Rfl:   •  prasugrel (EFFIENT) 10 MG tablet, Take 10 mg by mouth Daily., Disp: , Rfl:   •  DULoxetine (Cymbalta) 30 MG capsule, Take 1 capsule by mouth Daily., Disp: 30 capsule, Rfl: 0  •  fluticasone (Flonase) 50 MCG/ACT nasal spray, 2 sprays into the nostril(s) as directed by provider Daily., Disp: 18.2 mL, Rfl: 2  •  omeprazole (priLOSEC) 40 MG capsule, Take 1 capsule by mouth Daily., Disp: 30 capsule, Rfl:  "2  •  rOPINIRole (Requip) 2 MG tablet, Take 1 tablet by mouth Every Night., Disp: 30 tablet, Rfl: 1    Allergies:  Penicillins    Family Hx:  Family History   Problem Relation Age of Onset   • Heart disease Mother    • Hypertension Mother    • Arthritis Mother    • Heart disease Father    • Hypertension Father    • Arthritis Father    • Hypertension Sister    • Heart disease Sister    • Heart disease Brother    • Hypertension Brother         Social History:  Social History     Socioeconomic History   • Marital status:    Tobacco Use   • Smoking status: Current Every Day Smoker     Packs/day: 1.00     Years: 40.00     Pack years: 40.00   • Smokeless tobacco: Never Used   Substance and Sexual Activity   • Alcohol use: No   • Drug use: No   • Sexual activity: Defer     Comment: Marital status:        Review of Systems  Review of Systems    Objective:     /80   Pulse 78   Ht 172.7 cm (68\")   Wt 63 kg (138 lb 14.4 oz)   SpO2 97%   BMI 21.12 kg/m²   Physical Exam alert no distress please see Dr. Whitfield's note for more detailed information regarding physical exam findings    Lab Review  Results for orders placed or performed during the hospital encounter of 04/22/21   Stress Test With Myocardial Perfusion - Two Day   Result Value Ref Range    BH CV STRESS PROTOCOL 1 Pharmacologic     Stage 1 1     Duration Min Stage 1 0     Duration Sec Stage 1 10     Stress Dose Regadenoson Stage 1 0.4     Stress Comments Stage 1 10 sec bolus injection     Target HR (85%) 142 bpm    Max. Pred. HR (100%) 167 bpm    HR Stage 1 75     BP Stage 1 165/93     Baseline HR 50 bpm    Baseline /76 mmHg    Peak HR 86 bpm    Percent Max Pred HR 51.50 %    Percent Target HR 61 %    Peak /93 mmHg    Recovery HR 72 bpm    Recovery /93 mmHg    Estimated workload 1.0 METS    BH CV REST NUCLEAR ISOTOPE DOSE 32.3 mCi    BH CV STRESS NUCLEAR ISOTOPE DOSE 35.0 mCi    Nuc Stress EF 24 %            Assessment: "     Diagnoses and all orders for this visit:    1. Chronic bilateral low back pain without sciatica (Primary)  -     rOPINIRole (Requip) 2 MG tablet; Take 1 tablet by mouth Every Night.  Dispense: 30 tablet; Refill: 1  -     meloxicam (MOBIC) 15 MG tablet; Take 1 tablet by mouth Daily.  Dispense: 30 tablet; Refill: 2  -     DULoxetine (Cymbalta) 30 MG capsule; Take 1 capsule by mouth Daily.  Dispense: 30 capsule; Refill: 0  -     XR Hips Bilateral With or Without Pelvis 2 View; Future  -     Cancel: XR Knee 1 or 2 View Bilateral; Future  -     XR knee 4+ vw bilateral; Future    2. Gastroesophageal reflux disease with esophagitis, unspecified whether hemorrhage  -     omeprazole (priLOSEC) 40 MG capsule; Take 1 capsule by mouth Daily.  Dispense: 30 capsule; Refill: 2    3. Seasonal allergies  -     fluticasone (Flonase) 50 MCG/ACT nasal spray; 2 sprays into the nostril(s) as directed by provider Daily.  Dispense: 18.2 mL; Refill: 2    4. Tobacco use  -     albuterol sulfate  (90 Base) MCG/ACT inhaler; Inhale 2 puffs Every 4 (Four) Hours As Needed for Wheezing.  Dispense: 18 g; Refill: 11    5. COPD, mild (HCC)  -     albuterol sulfate  (90 Base) MCG/ACT inhaler; Inhale 2 puffs Every 4 (Four) Hours As Needed for Wheezing.  Dispense: 18 g; Refill: 11        Plan:     I have seen and examined the patient.  I have reviewed the notes, assessments, and/or procedures performed by Dr. Harrington, I concur with her/his documentation and assessment and plan for Julia Gay.            This document has been electronically signed by Ramon Feliz MD on November 8, 2021 10:15 CST

## 2021-11-21 DIAGNOSIS — M54.50 CHRONIC BILATERAL LOW BACK PAIN WITHOUT SCIATICA: ICD-10-CM

## 2021-11-21 DIAGNOSIS — G89.29 CHRONIC BILATERAL LOW BACK PAIN WITHOUT SCIATICA: ICD-10-CM

## 2021-11-22 ENCOUNTER — TELEPHONE (OUTPATIENT)
Dept: FAMILY MEDICINE CLINIC | Facility: CLINIC | Age: 51
End: 2021-11-22

## 2021-11-22 RX ORDER — DULOXETIN HYDROCHLORIDE 30 MG/1
30 CAPSULE, DELAYED RELEASE ORAL DAILY
Qty: 30 CAPSULE | Refills: 0 | Status: SHIPPED | OUTPATIENT
Start: 2021-11-22 | End: 2022-02-14

## 2021-11-22 NOTE — TELEPHONE ENCOUNTER
Attempted to contact pt to schedule apt. No answer. No voicemail set up.  ----- Message from Ralf Gutiérrez PharmD sent at 11/22/2021  9:25 AM CST -----  Patient is overdue for follow up

## 2022-01-21 DIAGNOSIS — G89.29 CHRONIC BILATERAL LOW BACK PAIN WITHOUT SCIATICA: ICD-10-CM

## 2022-01-21 DIAGNOSIS — M54.50 CHRONIC BILATERAL LOW BACK PAIN WITHOUT SCIATICA: ICD-10-CM

## 2022-01-21 DIAGNOSIS — K21.00 GASTROESOPHAGEAL REFLUX DISEASE WITH ESOPHAGITIS, UNSPECIFIED WHETHER HEMORRHAGE: ICD-10-CM

## 2022-01-21 RX ORDER — OMEPRAZOLE 40 MG/1
40 CAPSULE, DELAYED RELEASE ORAL DAILY
Qty: 30 CAPSULE | Refills: 2 | Status: SHIPPED | OUTPATIENT
Start: 2022-01-21 | End: 2023-02-27 | Stop reason: SDUPTHER

## 2022-01-21 RX ORDER — MELOXICAM 15 MG/1
15 TABLET ORAL DAILY
Qty: 30 TABLET | Refills: 2 | Status: SHIPPED | OUTPATIENT
Start: 2022-01-21 | End: 2022-03-14 | Stop reason: SDUPTHER

## 2022-01-23 DIAGNOSIS — J30.2 SEASONAL ALLERGIES: ICD-10-CM

## 2022-01-25 ENCOUNTER — TELEPHONE (OUTPATIENT)
Dept: FAMILY MEDICINE CLINIC | Facility: CLINIC | Age: 52
End: 2022-01-25

## 2022-01-25 RX ORDER — FLUTICASONE PROPIONATE 50 MCG
SPRAY, SUSPENSION (ML) NASAL
Qty: 16 G | Refills: 1 | Status: SHIPPED | OUTPATIENT
Start: 2022-01-25

## 2022-01-25 NOTE — TELEPHONE ENCOUNTER
ATTEMPTED TO CONTACT PT TO SCHEDULE APT. NO ANSWER. NO VOICEMAIL SET UP.  ----- Message from Ralf Gutiérrez PharmD sent at 1/25/2022  1:57 PM CST -----  Patient overdue for follow up with PCP

## 2022-02-14 ENCOUNTER — OFFICE VISIT (OUTPATIENT)
Dept: FAMILY MEDICINE CLINIC | Facility: CLINIC | Age: 52
End: 2022-02-14

## 2022-02-14 VITALS
SYSTOLIC BLOOD PRESSURE: 128 MMHG | OXYGEN SATURATION: 98 % | TEMPERATURE: 98.3 F | WEIGHT: 145 LBS | DIASTOLIC BLOOD PRESSURE: 70 MMHG | BODY MASS INDEX: 21.98 KG/M2 | HEIGHT: 68 IN | HEART RATE: 82 BPM

## 2022-02-14 DIAGNOSIS — G25.81 RESTLESS LEG SYNDROME: ICD-10-CM

## 2022-02-14 DIAGNOSIS — F99 INSOMNIA DUE TO OTHER MENTAL DISORDER: ICD-10-CM

## 2022-02-14 DIAGNOSIS — G89.29 CHRONIC BILATERAL LOW BACK PAIN WITHOUT SCIATICA: ICD-10-CM

## 2022-02-14 DIAGNOSIS — F33.0 MILD EPISODE OF RECURRENT MAJOR DEPRESSIVE DISORDER: Primary | ICD-10-CM

## 2022-02-14 DIAGNOSIS — M54.50 CHRONIC BILATERAL LOW BACK PAIN WITHOUT SCIATICA: ICD-10-CM

## 2022-02-14 DIAGNOSIS — F51.05 INSOMNIA DUE TO OTHER MENTAL DISORDER: ICD-10-CM

## 2022-02-14 PROCEDURE — 99214 OFFICE O/P EST MOD 30 MIN: CPT | Performed by: STUDENT IN AN ORGANIZED HEALTH CARE EDUCATION/TRAINING PROGRAM

## 2022-02-14 RX ORDER — MELATONIN 10 MG
10 CAPSULE ORAL NIGHTLY PRN
Qty: 30 CAPSULE | Refills: 1 | Status: SHIPPED | OUTPATIENT
Start: 2022-02-14 | End: 2022-05-02 | Stop reason: SDUPTHER

## 2022-02-14 RX ORDER — ROPINIROLE 2 MG/1
2 TABLET, FILM COATED ORAL NIGHTLY
Qty: 90 TABLET | Refills: 0 | Status: SHIPPED | OUTPATIENT
Start: 2022-02-14 | End: 2022-03-16

## 2022-02-14 RX ORDER — DULOXETIN HYDROCHLORIDE 30 MG/1
60 CAPSULE, DELAYED RELEASE ORAL DAILY
Qty: 90 CAPSULE | Refills: 0 | Status: SHIPPED | OUTPATIENT
Start: 2022-02-14 | End: 2022-03-14

## 2022-02-14 RX ORDER — ROPINIROLE 2 MG/1
2 TABLET, FILM COATED ORAL NIGHTLY
Qty: 30 TABLET | Refills: 1 | Status: SHIPPED | OUTPATIENT
Start: 2022-02-14 | End: 2022-02-14

## 2022-02-14 NOTE — PROGRESS NOTES
Julia Gay 2/14/2022  Subjective:     Julia Gay is a 51 y.o. male who presents for   Chief Complaint   Patient presents with   • Medication Refills       51-year-old male with history of reactive depression and anxiety GERD and chronic sleep disturbance along with restless leg syndrome and chronic pain here for follow-up visit please see Dr. Sandoval's note for more detailed information regarding today's encounter physical exam findings and plan of care.  Patient has been under a lot more stress lately due to health issues and his wife and daughter's daughter was recently diagnosed with colon cancer.  Patient continues with chronic sleep disturbance and continues to have difficulty getting to sleep at night often going 2 to 3 days without sleep.  Patient denies any hallucinations delusions or illusions no suicidal thoughts or ideation        Past Medical Hx:  Past Medical History:   Diagnosis Date   • Acute bronchitis    • Astigmatism    • Atherosclerotic heart disease of native coronary artery without angina pectoris    • Backache    • Benign hypertension    • Common cold    • Cough    • Dyspnea    • Folliculitis    • GERD (gastroesophageal reflux disease)    • History of echocardiogram 03/20/2012    Echocardiogram W/O color flow 23539 (1) - LV dilstation with mild LV dysfunction with EF 45%. Septal hypokinesis. Diastolic relaxation abnormality of left ventricle. Mild mitral and tricuspid regurgitation w/mild pulmonary insufficiency.   • Myopia    • Tobacco dependence syndrome        Past Surgical Hx:  Past Surgical History:   Procedure Laterality Date   • CARDIAC SURGERY  2000    cabg   • CAUTERIZATION NASAL BLEEDERS N/A 12/21/2018    Procedure: CAUTERIZE NASAL BLEEDERS;  Surgeon: Som Redd MD;  Location: [facility];  Service: ENT   • CORONARY ANGIOPLASTY WITH STENT PLACEMENT  2013    STENTS X 5   • ENDOSCOPIC FUNCTIONAL SINUS SURGERY (FESS) Bilateral 11/27/2018    Procedure: NASAL  SEPTAL REPAIR, BILATERAL PARTIAL INFERIOR TURBINECTOMIES, BILATERAL FRONTAL ENDOSCOPIC SINUS SURGERY, RIGHT ENDOSCOPIC ETHMOID SINUS SURGERY, RIGHT ENDOSCOPIC BILATERAL SINUS SURGERY;  Surgeon: Som Redd MD;  Location: [facility];  Service: ENT   • HERNIA REPAIR      x 2       Health Maintenance:  Health Maintenance   Topic Date Due   • COLORECTAL CANCER SCREENING  Never done   • LUNG CANCER SCREENING  Never done   • ANNUAL WELLNESS VISIT  02/17/2021   • COVID-19 Vaccine (2 - Booster for [place] series) 05/07/2021   • INFLUENZA VACCINE  08/01/2021   • TDAP/TD VACCINES (2 - Td or Tdap) 06/06/2027   • Pneumococcal Vaccine 0-64 (2 of 2 - PPSV23) 04/13/2033   • HEPATITIS C SCREENING  Completed   • ZOSTER VACCINE  Discontinued       Current Meds:    Current Outpatient Medications:   •  albuterol sulfate  (90 Base) MCG/ACT inhaler, Inhale 2 puffs Every 4 (Four) Hours As Needed for Wheezing., Disp: 18 g, Rfl: 11  •  aspirin 81 MG EC tablet, Take 1 tablet by mouth Daily., Disp: 90 tablet, Rfl: 3  •  atorvastatin (LIPITOR) 20 MG tablet, Take 1 tablet by mouth Every Night. at bedtime; Indication: cholesterol, Disp: 90 tablet, Rfl: 3  •  baclofen (LIORESAL) 10 MG tablet, , Disp: , Rfl:   •  carvedilol (COREG) 3.125 MG tablet, Take 1 tablet by mouth 2 (Two) Times a Day With Meals., Disp: 180 tablet, Rfl: 3  •  DULoxetine (CYMBALTA) 30 MG capsule, Take 2 capsules by mouth Daily., Disp: 90 capsule, Rfl: 0  •  fluticasone (FLONASE) 50 MCG/ACT nasal spray, TAKE 2 SPRAYS INTO THE NOSTRILS AS DIRECTED DAILY, Disp: 16 g, Rfl: 1  •  gabapentin (NEURONTIN) 300 MG capsule, Take 300 mg by mouth daily., Disp: , Rfl:   •  gabapentin (NEURONTIN) 600 MG tablet, , Disp: , Rfl:   •  meloxicam (MOBIC) 15 MG tablet, TAKE 1 TABLET BY MOUTH DAILY, Disp: 30 tablet, Rfl: 2  •  omeprazole (priLOSEC) 40 MG capsule, TAKE 1 CAPSULE BY MOUTH DAILY, Disp: 30 capsule, Rfl: 2  •  oxyCODONE-acetaminophen (PERCOCET) 7.5-325 MG per tablet, , Disp:  ", Rfl:   •  prasugrel (EFFIENT) 10 MG tablet, Take 10 mg by mouth Daily., Disp: , Rfl:   •  Melatonin 10 MG capsule, Take 10 mg by mouth At Night As Needed (Insomnia)., Disp: 30 capsule, Rfl: 1  •  rOPINIRole (REQUIP) 2 MG tablet, TAKE 1 TABLET BY MOUTH EVERY NIGHT, Disp: 90 tablet, Rfl: 0    Allergies:  Penicillins    Family Hx:  Family History   Problem Relation Age of Onset   • Heart disease Mother    • Hypertension Mother    • Arthritis Mother    • Heart disease Father    • Hypertension Father    • Arthritis Father    • Hypertension Sister    • Heart disease Sister    • Heart disease Brother    • Hypertension Brother         Social History:  Social History     Socioeconomic History   • Marital status:    Tobacco Use   • Smoking status: Current Every Day Smoker     Packs/day: 1.00     Years: 40.00     Pack years: 40.00   • Smokeless tobacco: Never Used   Vaping Use   • Vaping Use: Never used   Substance and Sexual Activity   • Alcohol use: No   • Drug use: No   • Sexual activity: Defer     Comment: Marital status:        Review of Systems  Review of Systems    Objective:     /70   Pulse 82   Temp 98.3 °F (36.8 °C)   Ht 172.7 cm (68\")   Wt 65.8 kg (145 lb)   SpO2 98%   BMI 22.05 kg/m²   Physical Exam alert no distress please see Dr. Sandoval's note for more detailed information regarding physical exam findings    Lab Review  Results for orders placed or performed during the hospital encounter of 04/22/21   Stress Test With Myocardial Perfusion - Two Day   Result Value Ref Range    BH CV STRESS PROTOCOL 1 Pharmacologic     Stage 1 1     Duration Min Stage 1 0     Duration Sec Stage 1 10     Stress Dose Regadenoson Stage 1 0.4     Stress Comments Stage 1 10 sec bolus injection     Target HR (85%) 142 bpm    Max. Pred. HR (100%) 167 bpm    HR Stage 1 75     BP Stage 1 165/93     Baseline HR 50 bpm    Baseline /76 mmHg    Peak HR 86 bpm    Percent Max Pred HR 51.50 %    Percent Target HR " 61 %    Peak /93 mmHg    Recovery HR 72 bpm    Recovery /93 mmHg    Estimated workload 1.0 METS    BH CV REST NUCLEAR ISOTOPE DOSE 32.3 mCi    BH CV STRESS NUCLEAR ISOTOPE DOSE 35.0 mCi    Nuc Stress EF 24 %            Assessment:     Diagnoses and all orders for this visit:    1. Mild episode of recurrent major depressive disorder (HCC) (Primary)  -     [employer] - Swab,  -     DULoxetine (CYMBALTA) 30 MG capsule; Take 2 capsules by mouth Daily.  Dispense: 90 capsule; Refill: 0    2. Chronic bilateral low back pain without sciatica  -     Discontinue: rOPINIRole (Requip) 2 MG tablet; Take 1 tablet by mouth Every Night.  Dispense: 30 tablet; Refill: 1  -     DULoxetine (CYMBALTA) 30 MG capsule; Take 2 capsules by mouth Daily.  Dispense: 90 capsule; Refill: 0    3. Restless leg syndrome  -     Discontinue: rOPINIRole (Requip) 2 MG tablet; Take 1 tablet by mouth Every Night.  Dispense: 30 tablet; Refill: 1    4. Insomnia due to other mental disorder  -     Melatonin 10 MG capsule; Take 10 mg by mouth At Night As Needed (Insomnia).  Dispense: 30 capsule; Refill: 1        Plan:   I was present onsite throughout the encounter and saw the patient alongside Dr. Sandoval.  Please see his note for more detailed information regarding plan of care  I have seen and examined the patient.  I have reviewed the notes, assessments, and/or procedures performed by Dr. Sandoval, I concur with her/his documentation and assessment and plan for Julia Gay.            This document has been electronically signed by Ramon Feliz MD on February 14, 2022 14:39 CST

## 2022-02-14 NOTE — PROGRESS NOTES
Family Medicine Residency  Thiago Sandoval MD    Subjective:     Julia Gay is a 51 y.o. male who presents for follow-up for his depression and restless leg syndrome.     Depression -reports symptoms are uncontrolled and he does not feel like the Cymbalta is working well so he would like to increase this.  Reports random episodes of crying and then laughing without reason.  Reports that symptoms are exacerbated by health concerns with his wife as well as his daughter who has colon cancer.  Denies any suicidal ideation, plan or intent, or HI.  Reports that he has trouble falling asleep and staying asleep.  Reports he has gone 2 to 3 days without sleep because he is not tired but denies any changes in mood during this time.  Denies feeling more depressed or more hyper or taking any risky behaviors during these times.  Reports that he has been diagnosed with depression for many years and has previously tried Paxil, Wellbutrin, and most recently Celexa without symptom control.    The following portions of the patient's history were reviewed and updated as appropriate: allergies, current medications, past family history, past medical history, past social history, past surgical history and problem list.    Past Medical Hx:  Past Medical History:   Diagnosis Date   • Acute bronchitis    • Astigmatism    • Atherosclerotic heart disease of native coronary artery without angina pectoris    • Backache    • Benign hypertension    • Common cold    • Cough    • Dyspnea    • Folliculitis    • GERD (gastroesophageal reflux disease)    • History of echocardiogram 03/20/2012    Echocardiogram W/O color flow 56896 (1) - LV dilstation with mild LV dysfunction with EF 45%. Septal hypokinesis. Diastolic relaxation abnormality of left ventricle. Mild mitral and tricuspid regurgitation w/mild pulmonary insufficiency.   • Myopia    • Tobacco dependence syndrome        Past Surgical Hx:  Past Surgical History:   Procedure  Laterality Date   • CARDIAC SURGERY  2000    cabg   • CAUTERIZATION NASAL BLEEDERS N/A 12/21/2018    Procedure: CAUTERIZE NASAL BLEEDERS;  Surgeon: Som Redd MD;  Location: [facility];  Service: ENT   • CORONARY ANGIOPLASTY WITH STENT PLACEMENT  2013    STENTS X 5   • ENDOSCOPIC FUNCTIONAL SINUS SURGERY (FESS) Bilateral 11/27/2018    Procedure: NASAL SEPTAL REPAIR, BILATERAL PARTIAL INFERIOR TURBINECTOMIES, BILATERAL FRONTAL ENDOSCOPIC SINUS SURGERY, RIGHT ENDOSCOPIC ETHMOID SINUS SURGERY, RIGHT ENDOSCOPIC BILATERAL SINUS SURGERY;  Surgeon: Som Redd MD;  Location: [facility];  Service: ENT   • HERNIA REPAIR      x 2       Current Meds:    Current Outpatient Medications:   •  albuterol sulfate  (90 Base) MCG/ACT inhaler, Inhale 2 puffs Every 4 (Four) Hours As Needed for Wheezing., Disp: 18 g, Rfl: 11  •  aspirin 81 MG EC tablet, Take 1 tablet by mouth Daily., Disp: 90 tablet, Rfl: 3  •  atorvastatin (LIPITOR) 20 MG tablet, Take 1 tablet by mouth Every Night. at bedtime; Indication: cholesterol, Disp: 90 tablet, Rfl: 3  •  baclofen (LIORESAL) 10 MG tablet, , Disp: , Rfl:   •  carvedilol (COREG) 3.125 MG tablet, Take 1 tablet by mouth 2 (Two) Times a Day With Meals., Disp: 180 tablet, Rfl: 3  •  DULoxetine (CYMBALTA) 30 MG capsule, Take 2 capsules by mouth Daily., Disp: 90 capsule, Rfl: 0  •  fluticasone (FLONASE) 50 MCG/ACT nasal spray, TAKE 2 SPRAYS INTO THE NOSTRILS AS DIRECTED DAILY, Disp: 16 g, Rfl: 1  •  gabapentin (NEURONTIN) 300 MG capsule, Take 300 mg by mouth daily., Disp: , Rfl:   •  gabapentin (NEURONTIN) 600 MG tablet, , Disp: , Rfl:   •  meloxicam (MOBIC) 15 MG tablet, TAKE 1 TABLET BY MOUTH DAILY, Disp: 30 tablet, Rfl: 2  •  omeprazole (priLOSEC) 40 MG capsule, TAKE 1 CAPSULE BY MOUTH DAILY, Disp: 30 capsule, Rfl: 2  •  oxyCODONE-acetaminophen (PERCOCET) 7.5-325 MG per tablet, , Disp: , Rfl:   •  prasugrel (EFFIENT) 10 MG tablet, Take 10 mg by mouth Daily., Disp: , Rfl:   •   "rOPINIRole (Requip) 2 MG tablet, Take 1 tablet by mouth Every Night., Disp: 30 tablet, Rfl: 1  •  Melatonin 10 MG capsule, Take 10 mg by mouth At Night As Needed (Insomnia)., Disp: 30 capsule, Rfl: 1    Allergies:  Allergies   Allergen Reactions   • Penicillins Swelling     Facial swelling       Family Hx:  Family History   Problem Relation Age of Onset   • Heart disease Mother    • Hypertension Mother    • Arthritis Mother    • Heart disease Father    • Hypertension Father    • Arthritis Father    • Hypertension Sister    • Heart disease Sister    • Heart disease Brother    • Hypertension Brother         Social History:  Social History     Socioeconomic History   • Marital status:    Tobacco Use   • Smoking status: Current Every Day Smoker     Packs/day: 1.00     Years: 40.00     Pack years: 40.00   • Smokeless tobacco: Never Used   Vaping Use   • Vaping Use: Never used   Substance and Sexual Activity   • Alcohol use: No   • Drug use: No   • Sexual activity: Defer     Comment: Marital status:        Review of Systems  Review of Systems   Constitutional: Negative for fever.   Respiratory: Negative for shortness of breath.    Cardiovascular: Negative for chest pain and leg swelling.   Gastrointestinal: Negative for abdominal pain, constipation, diarrhea, nausea and vomiting.   Genitourinary: Negative for dysuria.   Psychiatric/Behavioral: Positive for dysphoric mood and sleep disturbance. Negative for suicidal ideas.       Objective:     /70   Pulse 82   Temp 98.3 °F (36.8 °C)   Ht 172.7 cm (68\")   Wt 65.8 kg (145 lb)   SpO2 98%   BMI 22.05 kg/m²   Physical Exam  Constitutional:       Appearance: He is not ill-appearing.   HENT:      Head: Normocephalic and atraumatic.   Cardiovascular:      Rate and Rhythm: Normal rate and regular rhythm.   Pulmonary:      Effort: Pulmonary effort is normal.      Breath sounds: No rhonchi or rales.   Abdominal:      Palpations: Abdomen is soft.      " Tenderness: There is no abdominal tenderness.   Musculoskeletal:      Right lower leg: No edema.      Left lower leg: No edema.   Skin:     General: Skin is warm.   Neurological:      General: No focal deficit present.      Mental Status: He is alert.   Psychiatric:         Attention and Perception: Attention normal.         Mood and Affect: Mood is anxious.         Speech: Delayed: Rapid speech.         Behavior: Behavior is cooperative.         Thought Content: Thought content normal.          Assessment/Plan:     Diagnoses and all orders for this visit:    1. Mild episode of recurrent major depressive disorder (HCC) (Primary)  -     [facility] - Swab,  -     DULoxetine (CYMBALTA) 30 MG capsule; Take 2 capsules by mouth Daily.  Dispense: 90 capsule; Refill: 0  -Stable but symptoms remain uncontrolled.  Will increase patient's Cymbalta to 60 mg daily at this time.  Have ordered [facility] testing today.  We will have patient follow-up in 1 month to reevaluate if Cymbalta has had any effects and if not can refer to [facility] testing to determine potential better medication alternative at that time.  Patient advised that if SI, plan/intent, or HI occur to seek medical care or go to the ED.  Patient expressed understanding.  Encourage patient benefit of testing for vitamin D deficiency related to depression but patient does not wish to obtain lab at this time.  He was agreeable to getting lab at next visit.    2. Chronic bilateral low back pain without sciatica  -     rOPINIRole (Requip) 2 MG tablet; Take 1 tablet by mouth Every Night.  Dispense: 30 tablet; Refill: 1  -     DULoxetine (CYMBALTA) 30 MG capsule; Take 2 capsules by mouth Daily.  Dispense: 90 capsule; Refill: 0  -Stable on current medical therapy.  Refilled at this time.    3. Restless leg syndrome  -     rOPINIRole (Requip) 2 MG tablet; Take 1 tablet by mouth Every Night.  Dispense: 30 tablet; Refill: 1  -Stable on current medical therapy.  Refilled at  this time.    4. Insomnia due to other mental disorder  -     Melatonin 10 MG capsule; Take 10 mg by mouth At Night As Needed (Insomnia).  Dispense: 30 capsule; Refill: 1  - Insomnia likely related to patient's uncontrolled depression.  Advised to start melatonin and reevaluate after depression is better controlled.      · Rx changes: refer to a/p  · Patient Education: Patient educated on Depression affecting sleep  · Compliance at present is estimated to be good.   · Efforts to improve compliance (if necessary) will be directed at improving mood.    Depression screening: Patient screened positive for depression based on a PHQ-9 score of 11 on 10/18/2021. Follow-up recommendations include: PCP managing depression.     Follow-up:     Return in about 1 month (around 3/14/2022) for Recheck.    Preventative:  Health Maintenance   Topic Date Due   • COLORECTAL CANCER SCREENING  Never done   • LUNG CANCER SCREENING  Never done   • ANNUAL WELLNESS VISIT  02/17/2021   • COVID-19 Vaccine (2 - Booster for Chelsea series) 05/07/2021   • INFLUENZA VACCINE  08/01/2021   • TDAP/TD VACCINES (2 - Td or Tdap) 06/06/2027   • Pneumococcal Vaccine 0-64 (2 of 2 - PPSV23) 04/13/2033   • HEPATITIS C SCREENING  Completed   • ZOSTER VACCINE  Discontinued     Male Preventative: Colon cancer screening is due.  Recommended: Influenza  Vaccine Counseling:  at future visit    Weight  -Class: Normal: 18.5-24.9kg/m2  -Patient's Body mass index is 22.05 kg/m². indicating that he is within normal range (BMI 18.5-24.9). No BMI management plan needed..  Alcohol use:  reports no history of alcohol use.  Nicotine status  reports that he has been smoking. He has a 40.00 pack-year smoking history. He has never used smokeless tobacco.    Goals    None         RISK SCORE: 3      This document has been electronically signed by Thiago Sandoval MD on February 14, 2022 10:43 CST

## 2022-03-14 ENCOUNTER — OFFICE VISIT (OUTPATIENT)
Dept: FAMILY MEDICINE CLINIC | Facility: CLINIC | Age: 52
End: 2022-03-14

## 2022-03-14 VITALS
HEART RATE: 61 BPM | SYSTOLIC BLOOD PRESSURE: 128 MMHG | BODY MASS INDEX: 22.85 KG/M2 | DIASTOLIC BLOOD PRESSURE: 78 MMHG | WEIGHT: 150.8 LBS | OXYGEN SATURATION: 95 % | HEIGHT: 68 IN

## 2022-03-14 DIAGNOSIS — Z65.8: ICD-10-CM

## 2022-03-14 DIAGNOSIS — F33.0 MILD EPISODE OF RECURRENT MAJOR DEPRESSIVE DISORDER: ICD-10-CM

## 2022-03-14 DIAGNOSIS — G89.29 CHRONIC BILATERAL LOW BACK PAIN WITHOUT SCIATICA: ICD-10-CM

## 2022-03-14 DIAGNOSIS — L21.9 SEBORRHEIC DERMATITIS OF SCALP: Primary | ICD-10-CM

## 2022-03-14 DIAGNOSIS — M54.50 CHRONIC BILATERAL LOW BACK PAIN WITHOUT SCIATICA: ICD-10-CM

## 2022-03-14 PROCEDURE — 99213 OFFICE O/P EST LOW 20 MIN: CPT | Performed by: STUDENT IN AN ORGANIZED HEALTH CARE EDUCATION/TRAINING PROGRAM

## 2022-03-14 RX ORDER — KETOCONAZOLE 20 MG/ML
SHAMPOO TOPICAL 2 TIMES WEEKLY
Qty: 120 ML | Refills: 0 | Status: SHIPPED | OUTPATIENT
Start: 2022-03-14 | End: 2022-12-27

## 2022-03-14 RX ORDER — BUPROPION HYDROCHLORIDE 150 MG/1
150 TABLET ORAL DAILY
Qty: 30 TABLET | Refills: 0 | Status: SHIPPED | OUTPATIENT
Start: 2022-03-14 | End: 2022-04-11

## 2022-03-14 RX ORDER — DULOXETIN HYDROCHLORIDE 60 MG/1
60 CAPSULE, DELAYED RELEASE ORAL DAILY
Qty: 30 CAPSULE | Refills: 2 | Status: SHIPPED | OUTPATIENT
Start: 2022-03-14 | End: 2023-01-24

## 2022-03-14 RX ORDER — MELOXICAM 15 MG/1
15 TABLET ORAL DAILY
Qty: 30 TABLET | Refills: 2 | Status: SHIPPED | OUTPATIENT
Start: 2022-03-14 | End: 2023-02-27 | Stop reason: SDUPTHER

## 2022-03-16 DIAGNOSIS — G89.29 CHRONIC BILATERAL LOW BACK PAIN WITHOUT SCIATICA: ICD-10-CM

## 2022-03-16 DIAGNOSIS — G25.81 RESTLESS LEG SYNDROME: ICD-10-CM

## 2022-03-16 DIAGNOSIS — M54.50 CHRONIC BILATERAL LOW BACK PAIN WITHOUT SCIATICA: ICD-10-CM

## 2022-03-16 RX ORDER — ROPINIROLE 2 MG/1
2 TABLET, FILM COATED ORAL NIGHTLY
Qty: 90 TABLET | Refills: 0 | Status: SHIPPED | OUTPATIENT
Start: 2022-03-16 | End: 2023-02-27 | Stop reason: SDUPTHER

## 2022-03-16 NOTE — PROGRESS NOTES
Family Medicine Residency  Yan Harrington MD    Subjective:     Julia Gay is a 51 y.o. male who presents for refills and evaluation of a scalp rash and smoking cessation.    Patient has had a rash on his scalp that has been very irritating for the last few weeks.  Has tried dandruff shampoo without success.  Patient is also interested in smoking cessation and is wondering if anything can be provided to help him quit.    The following portions of the patient's history were reviewed and updated as appropriate: allergies, current medications, past family history, past medical history, past social history, past surgical history and problem list.    Past Medical Hx:  Past Medical History:   Diagnosis Date   • Acute bronchitis    • Astigmatism    • Atherosclerotic heart disease of native coronary artery without angina pectoris    • Backache    • Benign hypertension    • Common cold    • Cough    • Dyspnea    • Folliculitis    • GERD (gastroesophageal reflux disease)    • History of echocardiogram 03/20/2012    Echocardiogram W/O color flow 70689 (1) - LV dilstation with mild LV dysfunction with EF 45%. Septal hypokinesis. Diastolic relaxation abnormality of left ventricle. Mild mitral and tricuspid regurgitation w/mild pulmonary insufficiency.   • Myopia    • Tobacco dependence syndrome        Past Surgical Hx:  Past Surgical History:   Procedure Laterality Date   • CARDIAC SURGERY  2000    cabg   • CAUTERIZATION NASAL BLEEDERS N/A 12/21/2018    Procedure: CAUTERIZE NASAL BLEEDERS;  Surgeon: Som Redd MD;  Location: [facility];  Service: ENT   • CORONARY ANGIOPLASTY WITH STENT PLACEMENT  2013    STENTS X 5   • ENDOSCOPIC FUNCTIONAL SINUS SURGERY (FESS) Bilateral 11/27/2018    Procedure: NASAL SEPTAL REPAIR, BILATERAL PARTIAL INFERIOR TURBINECTOMIES, BILATERAL FRONTAL ENDOSCOPIC SINUS SURGERY, RIGHT ENDOSCOPIC ETHMOID SINUS SURGERY, RIGHT ENDOSCOPIC BILATERAL SINUS SURGERY;  Surgeon: Som Redd MD;   Location: [facility] OR;  Service: ENT   • HERNIA REPAIR      x 2       Current Meds:    Current Outpatient Medications:   •  albuterol sulfate  (90 Base) MCG/ACT inhaler, Inhale 2 puffs Every 4 (Four) Hours As Needed for Wheezing., Disp: 18 g, Rfl: 11  •  aspirin 81 MG EC tablet, Take 1 tablet by mouth Daily., Disp: 90 tablet, Rfl: 3  •  atorvastatin (LIPITOR) 20 MG tablet, Take 1 tablet by mouth Every Night. at bedtime; Indication: cholesterol, Disp: 90 tablet, Rfl: 3  •  baclofen (LIORESAL) 10 MG tablet, , Disp: , Rfl:   •  carvedilol (COREG) 3.125 MG tablet, Take 1 tablet by mouth 2 (Two) Times a Day With Meals., Disp: 180 tablet, Rfl: 3  •  fluticasone (FLONASE) 50 MCG/ACT nasal spray, TAKE 2 SPRAYS INTO THE NOSTRILS AS DIRECTED DAILY, Disp: 16 g, Rfl: 1  •  gabapentin (NEURONTIN) 300 MG capsule, Take 300 mg by mouth daily., Disp: , Rfl:   •  gabapentin (NEURONTIN) 600 MG tablet, , Disp: , Rfl:   •  Melatonin 10 MG capsule, Take 10 mg by mouth At Night As Needed (Insomnia)., Disp: 30 capsule, Rfl: 1  •  meloxicam (MOBIC) 15 MG tablet, Take 1 tablet by mouth Daily., Disp: 30 tablet, Rfl: 2  •  omeprazole (priLOSEC) 40 MG capsule, TAKE 1 CAPSULE BY MOUTH DAILY, Disp: 30 capsule, Rfl: 2  •  oxyCODONE-acetaminophen (PERCOCET) 7.5-325 MG per tablet, , Disp: , Rfl:   •  prasugrel (EFFIENT) 10 MG tablet, Take 10 mg by mouth Daily., Disp: , Rfl:   •  rOPINIRole (REQUIP) 2 MG tablet, TAKE 1 TABLET BY MOUTH EVERY NIGHT, Disp: 90 tablet, Rfl: 0  •  buPROPion XL (Wellbutrin XL) 150 MG 24 hr tablet, Take 1 tablet by mouth Daily., Disp: 30 tablet, Rfl: 0  •  DULoxetine (CYMBALTA) 60 MG capsule, Take 1 capsule by mouth Daily., Disp: 30 capsule, Rfl: 2  •  ketoconazole (NIZORAL) 2 % shampoo, Apply  topically to the appropriate area as directed 2 (Two) Times a Week., Disp: 120 mL, Rfl: 0    Allergies:  Allergies   Allergen Reactions   • Penicillins Swelling     Facial swelling       Family Hx:  Family History   Problem  "Relation Age of Onset   • Heart disease Mother    • Hypertension Mother    • Arthritis Mother    • Heart disease Father    • Hypertension Father    • Arthritis Father    • Hypertension Sister    • Heart disease Sister    • Heart disease Brother    • Hypertension Brother         Social History:  Social History     Socioeconomic History   • Marital status:    Tobacco Use   • Smoking status: Current Every Day Smoker     Packs/day: 1.00     Years: 40.00     Pack years: 40.00   • Smokeless tobacco: Never Used   Vaping Use   • Vaping Use: Never used   Substance and Sexual Activity   • Alcohol use: No   • Drug use: No   • Sexual activity: Defer     Comment: Marital status:        Review of Systems  Review of Systems   Constitutional: Negative for chills and fever.   HENT: Negative for congestion, rhinorrhea and sore throat.    Eyes: Negative for visual disturbance.   Respiratory: Negative for chest tightness and shortness of breath.    Cardiovascular: Negative for chest pain and palpitations.   Gastrointestinal: Negative for abdominal pain, diarrhea, nausea and vomiting.   Endocrine: Negative for polyuria.   Genitourinary: Negative for difficulty urinating and dysuria.   Musculoskeletal: Positive for back pain. Negative for myalgias.   Skin: Positive for rash. Negative for wound.   Neurological: Negative for dizziness, weakness and numbness.   Hematological: Does not bruise/bleed easily.   Psychiatric/Behavioral: Negative for agitation, behavioral problems and confusion.       Objective:     /78   Pulse 61   Ht 172.7 cm (68\")   Wt 68.4 kg (150 lb 12.8 oz)   SpO2 95%   BMI 22.93 kg/m²   Physical Exam  Constitutional:       General: He is not in acute distress.  HENT:      Head: Normocephalic and atraumatic.      Right Ear: External ear normal.      Left Ear: External ear normal.   Cardiovascular:      Rate and Rhythm: Normal rate and regular rhythm.      Heart sounds: Normal heart sounds. "   Pulmonary:      Effort: Pulmonary effort is normal. No respiratory distress.      Breath sounds: Normal breath sounds.   Abdominal:      General: Bowel sounds are normal.      Palpations: Abdomen is soft.      Tenderness: There is no abdominal tenderness.   Musculoskeletal:      Right lower leg: No edema.      Left lower leg: No edema.   Skin:     General: Skin is warm and dry.      Comments: Scalp dermatitis lesions.  About 1cm in diameter at base of hair follicles.   Neurological:      Mental Status: He is alert.      Comments: Moving all extremities   Psychiatric:         Mood and Affect: Mood normal.         Behavior: Behavior normal.          Assessment/Plan:     Diagnoses and all orders for this visit:    1. Seborrheic dermatitis of scalp (Primary)  Likely seborrheic dermatitis.  Can use keto shampoo to treat and follow up.  -     ketoconazole (NIZORAL) 2 % shampoo; Apply  topically to the appropriate area as directed 2 (Two) Times a Week.  Dispense: 120 mL; Refill: 0    2. Chronic bilateral low back pain without sciatica  Stable.  -     meloxicam (MOBIC) 15 MG tablet; Take 1 tablet by mouth Daily.  Dispense: 30 tablet; Refill: 2    3. Mild episode of recurrent major depressive disorder (HCC)  Can augment with wellbutrin.  -     DULoxetine (CYMBALTA) 60 MG capsule; Take 1 capsule by mouth Daily.  Dispense: 30 capsule; Refill: 2  -     buPROPion XL (Wellbutrin XL) 150 MG 24 hr tablet; Take 1 tablet by mouth Daily.  Dispense: 30 tablet; Refill: 0    4. Lack of smoking cessation support  Will provide wellbutrin.  -     buPROPion XL (Wellbutrin XL) 150 MG 24 hr tablet; Take 1 tablet by mouth Daily.  Dispense: 30 tablet; Refill: 0             Follow-up:     1 month    Preventative:  Health Maintenance   Topic Date Due   • COLORECTAL CANCER SCREENING  Never done   • LUNG CANCER SCREENING  Never done   • ANNUAL WELLNESS VISIT  02/17/2021   • COVID-19 Vaccine (2 - Booster for Chelsea series) 05/07/2021   • INFLUENZA  VACCINE  08/01/2021   • TDAP/TD VACCINES (2 - Td or Tdap) 06/06/2027   • Pneumococcal Vaccine 0-64 (2 of 2 - PPSV23) 04/13/2033   • HEPATITIS C SCREENING  Completed   • ZOSTER VACCINE  Discontinued       Alcohol use:  reports no history of alcohol use.  Nicotine status  reports that he has been smoking. He has a 40.00 pack-year smoking history. He has never used smokeless tobacco.    RISK SCORE: 2      This document has been electronically signed by Yan Harrington MD on March 15, 2022 21:32 CDT

## 2022-03-22 NOTE — PROGRESS NOTES
I have seen the patient.  I have reviewed the notes, assessments, and/or procedures performed by Yan Harrington MD, I concur with her/his documentation and assessment and plan for Julia Gay.               This document has been electronically signed by Ramon Feliz MD on March 22, 2022 10:56 CDT

## 2022-04-11 DIAGNOSIS — F33.0 MILD EPISODE OF RECURRENT MAJOR DEPRESSIVE DISORDER: ICD-10-CM

## 2022-04-11 DIAGNOSIS — Z65.8: ICD-10-CM

## 2022-04-11 RX ORDER — BUPROPION HYDROCHLORIDE 150 MG/1
150 TABLET ORAL DAILY
Qty: 30 TABLET | Refills: 0 | Status: SHIPPED | OUTPATIENT
Start: 2022-04-11 | End: 2022-05-02

## 2022-05-02 ENCOUNTER — TELEPHONE (OUTPATIENT)
Dept: FAMILY MEDICINE CLINIC | Facility: CLINIC | Age: 52
End: 2022-05-02

## 2022-05-02 DIAGNOSIS — F99 INSOMNIA DUE TO OTHER MENTAL DISORDER: ICD-10-CM

## 2022-05-02 DIAGNOSIS — F33.0 MILD EPISODE OF RECURRENT MAJOR DEPRESSIVE DISORDER: ICD-10-CM

## 2022-05-02 DIAGNOSIS — Z65.8: ICD-10-CM

## 2022-05-02 DIAGNOSIS — F51.05 INSOMNIA DUE TO OTHER MENTAL DISORDER: ICD-10-CM

## 2022-05-02 RX ORDER — BUPROPION HYDROCHLORIDE 150 MG/1
150 TABLET ORAL DAILY
Qty: 30 TABLET | Refills: 0 | Status: SHIPPED | OUTPATIENT
Start: 2022-05-02 | End: 2023-03-06

## 2022-05-02 RX ORDER — MELATONIN 10 MG
10 CAPSULE ORAL NIGHTLY PRN
Qty: 30 CAPSULE | Refills: 1 | Status: SHIPPED | OUTPATIENT
Start: 2022-05-02 | End: 2023-02-06 | Stop reason: SDUPTHER

## 2022-05-02 NOTE — TELEPHONE ENCOUNTER
Incoming Refill Request      Medication requested (name and dose): buPROPion XL (WELLBUTRIN XL) 150 MG 24 hr tablet   Melatonin 10 MG capsule  Pharmacy where request should be sent: [facility]    Additional details provided by patient:     Best call back number: [phone]    Does the patient have less than a 3 day supply:  [x] Yes  [] No    Ora Nam  05/02/22, 13:08 CDT

## 2022-05-03 ENCOUNTER — TELEPHONE (OUTPATIENT)
Dept: FAMILY MEDICINE CLINIC | Facility: CLINIC | Age: 52
End: 2022-05-03

## 2022-05-03 NOTE — TELEPHONE ENCOUNTER
NO ANSWER WHEN TRYING TO SCHEDULE APT.  ----- Message from Ralf Gutiérrez PharmD sent at 5/2/2022  1:16 PM CDT -----  Patient overdue for follow up

## 2022-07-25 ENCOUNTER — TELEPHONE (OUTPATIENT)
Dept: FAMILY MEDICINE CLINIC | Facility: CLINIC | Age: 52
End: 2022-07-25

## 2022-11-02 ENCOUNTER — TRANSCRIBE ORDERS (OUTPATIENT)
Dept: GENERAL RADIOLOGY | Facility: CLINIC | Age: 52
End: 2022-11-02

## 2022-11-02 DIAGNOSIS — M54.2 CERVICALGIA: Primary | ICD-10-CM

## 2022-12-08 DIAGNOSIS — Z72.0 TOBACCO USE: ICD-10-CM

## 2022-12-08 DIAGNOSIS — J44.9 COPD, MILD: ICD-10-CM

## 2022-12-08 RX ORDER — ALBUTEROL SULFATE 90 UG/1
AEROSOL, METERED RESPIRATORY (INHALATION)
Qty: 17 G | Refills: 0 | Status: SHIPPED | OUTPATIENT
Start: 2022-12-08 | End: 2023-02-27 | Stop reason: SDUPTHER

## 2022-12-24 DIAGNOSIS — L21.9 SEBORRHEIC DERMATITIS OF SCALP: ICD-10-CM

## 2022-12-27 RX ORDER — KETOCONAZOLE 20 MG/ML
SHAMPOO TOPICAL
Qty: 120 ML | Refills: 0 | Status: SHIPPED | OUTPATIENT
Start: 2022-12-27

## 2023-01-24 ENCOUNTER — TELEPHONE (OUTPATIENT)
Dept: FAMILY MEDICINE CLINIC | Facility: CLINIC | Age: 53
End: 2023-01-24
Payer: MEDICARE

## 2023-01-24 DIAGNOSIS — F33.0 MILD EPISODE OF RECURRENT MAJOR DEPRESSIVE DISORDER: ICD-10-CM

## 2023-01-24 RX ORDER — DULOXETIN HYDROCHLORIDE 60 MG/1
60 CAPSULE, DELAYED RELEASE ORAL DAILY
Qty: 30 CAPSULE | Refills: 2 | Status: SHIPPED | OUTPATIENT
Start: 2023-01-24

## 2023-02-06 DIAGNOSIS — F51.05 INSOMNIA DUE TO OTHER MENTAL DISORDER: ICD-10-CM

## 2023-02-06 DIAGNOSIS — F99 INSOMNIA DUE TO OTHER MENTAL DISORDER: ICD-10-CM

## 2023-02-06 RX ORDER — MELATONIN 10 MG
10 CAPSULE ORAL NIGHTLY PRN
Qty: 30 CAPSULE | Refills: 1 | Status: SHIPPED | OUTPATIENT
Start: 2023-02-06

## 2023-02-27 ENCOUNTER — OFFICE VISIT (OUTPATIENT)
Dept: FAMILY MEDICINE CLINIC | Facility: CLINIC | Age: 53
End: 2023-02-27
Payer: MEDICARE

## 2023-02-27 ENCOUNTER — LAB (OUTPATIENT)
Dept: LAB | Facility: HOSPITAL | Age: 53
End: 2023-02-27
Payer: MEDICARE

## 2023-02-27 VITALS
SYSTOLIC BLOOD PRESSURE: 128 MMHG | BODY MASS INDEX: 26.36 KG/M2 | OXYGEN SATURATION: 98 % | TEMPERATURE: 97.1 F | WEIGHT: 173.9 LBS | HEART RATE: 89 BPM | DIASTOLIC BLOOD PRESSURE: 80 MMHG | HEIGHT: 68 IN

## 2023-02-27 DIAGNOSIS — E55.9 VITAMIN D DEFICIENCY, UNSPECIFIED: ICD-10-CM

## 2023-02-27 DIAGNOSIS — Z00.00 ENCOUNTER FOR ANNUAL WELLNESS EXAM IN MEDICARE PATIENT: Primary | ICD-10-CM

## 2023-02-27 DIAGNOSIS — G25.81 RESTLESS LEG SYNDROME: ICD-10-CM

## 2023-02-27 DIAGNOSIS — Z12.12 SCREENING FOR COLORECTAL CANCER: ICD-10-CM

## 2023-02-27 DIAGNOSIS — K21.00 GASTROESOPHAGEAL REFLUX DISEASE WITH ESOPHAGITIS, UNSPECIFIED WHETHER HEMORRHAGE: ICD-10-CM

## 2023-02-27 DIAGNOSIS — Z13.21 ENCOUNTER FOR VITAMIN DEFICIENCY SCREENING: ICD-10-CM

## 2023-02-27 DIAGNOSIS — Z00.00 ENCOUNTER FOR ANNUAL WELLNESS EXAM IN MEDICARE PATIENT: ICD-10-CM

## 2023-02-27 DIAGNOSIS — Z87.891 PERSONAL HISTORY OF NICOTINE DEPENDENCE: ICD-10-CM

## 2023-02-27 DIAGNOSIS — M54.50 CHRONIC BILATERAL LOW BACK PAIN WITHOUT SCIATICA: ICD-10-CM

## 2023-02-27 DIAGNOSIS — Z12.11 SCREENING FOR COLORECTAL CANCER: ICD-10-CM

## 2023-02-27 DIAGNOSIS — G89.29 CHRONIC BILATERAL LOW BACK PAIN WITHOUT SCIATICA: ICD-10-CM

## 2023-02-27 DIAGNOSIS — F32.A DEPRESSION, UNSPECIFIED DEPRESSION TYPE: ICD-10-CM

## 2023-02-27 DIAGNOSIS — J44.9 COPD, MILD: ICD-10-CM

## 2023-02-27 LAB — HBA1C MFR BLD: 5.5 % (ref 4.8–5.6)

## 2023-02-27 PROCEDURE — 36415 COLL VENOUS BLD VENIPUNCTURE: CPT

## 2023-02-27 PROCEDURE — 83036 HEMOGLOBIN GLYCOSYLATED A1C: CPT

## 2023-02-27 PROCEDURE — G0439 PPPS, SUBSEQ VISIT: HCPCS

## 2023-02-27 PROCEDURE — 1170F FXNL STATUS ASSESSED: CPT

## 2023-02-27 PROCEDURE — 82306 VITAMIN D 25 HYDROXY: CPT

## 2023-02-27 PROCEDURE — 1159F MED LIST DOCD IN RCRD: CPT

## 2023-02-27 PROCEDURE — 85025 COMPLETE CBC W/AUTO DIFF WBC: CPT

## 2023-02-27 PROCEDURE — 80053 COMPREHEN METABOLIC PANEL: CPT

## 2023-02-27 PROCEDURE — 80061 LIPID PANEL: CPT

## 2023-02-27 RX ORDER — ALBUTEROL SULFATE 90 UG/1
2 AEROSOL, METERED RESPIRATORY (INHALATION)
Qty: 17 G | Refills: 3 | Status: SHIPPED | OUTPATIENT
Start: 2023-02-27

## 2023-02-27 RX ORDER — OMEPRAZOLE 40 MG/1
40 CAPSULE, DELAYED RELEASE ORAL DAILY
Qty: 30 CAPSULE | Refills: 2 | Status: SHIPPED | OUTPATIENT
Start: 2023-02-27

## 2023-02-27 RX ORDER — MELOXICAM 15 MG/1
15 TABLET ORAL DAILY
Qty: 30 TABLET | Refills: 2 | Status: SHIPPED | OUTPATIENT
Start: 2023-02-27

## 2023-02-27 RX ORDER — ROPINIROLE 2 MG/1
2 TABLET, FILM COATED ORAL NIGHTLY
Qty: 90 TABLET | Refills: 0 | Status: SHIPPED | OUTPATIENT
Start: 2023-02-27

## 2023-02-27 RX ORDER — ARIPIPRAZOLE 10 MG/1
TABLET ORAL
COMMUNITY
Start: 2022-12-19

## 2023-02-28 LAB
25(OH)D3 SERPL-MCNC: 13.5 NG/ML (ref 30–100)
ALBUMIN SERPL-MCNC: 4.4 G/DL (ref 3.5–5.2)
ALBUMIN/GLOB SERPL: 1.4 G/DL
ALP SERPL-CCNC: 94 U/L (ref 39–117)
ALT SERPL W P-5'-P-CCNC: 48 U/L (ref 1–41)
ANION GAP SERPL CALCULATED.3IONS-SCNC: 11.2 MMOL/L (ref 5–15)
AST SERPL-CCNC: 33 U/L (ref 1–40)
BASOPHILS # BLD AUTO: 0.04 10*3/MM3 (ref 0–0.2)
BASOPHILS NFR BLD AUTO: 0.5 % (ref 0–1.5)
BILIRUB SERPL-MCNC: 0.7 MG/DL (ref 0–1.2)
BUN SERPL-MCNC: 17 MG/DL (ref 6–20)
BUN/CREAT SERPL: 18.9 (ref 7–25)
CALCIUM SPEC-SCNC: 9.6 MG/DL (ref 8.6–10.5)
CHLORIDE SERPL-SCNC: 99 MMOL/L (ref 98–107)
CHOLEST SERPL-MCNC: 191 MG/DL (ref 0–200)
CO2 SERPL-SCNC: 29.8 MMOL/L (ref 22–29)
CREAT SERPL-MCNC: 0.9 MG/DL (ref 0.76–1.27)
DEPRECATED RDW RBC AUTO: 43.7 FL (ref 37–54)
EGFRCR SERPLBLD CKD-EPI 2021: 101.5 ML/MIN/1.73
EOSINOPHIL # BLD AUTO: 0.18 10*3/MM3 (ref 0–0.4)
EOSINOPHIL NFR BLD AUTO: 2.4 % (ref 0.3–6.2)
ERYTHROCYTE [DISTWIDTH] IN BLOOD BY AUTOMATED COUNT: 13 % (ref 12.3–15.4)
GLOBULIN UR ELPH-MCNC: 3.2 GM/DL
GLUCOSE SERPL-MCNC: 79 MG/DL (ref 65–99)
HCT VFR BLD AUTO: 40.9 % (ref 37.5–51)
HDLC SERPL-MCNC: 42 MG/DL (ref 40–60)
HGB BLD-MCNC: 14.4 G/DL (ref 13–17.7)
IMM GRANULOCYTES # BLD AUTO: 0.05 10*3/MM3 (ref 0–0.05)
IMM GRANULOCYTES NFR BLD AUTO: 0.7 % (ref 0–0.5)
LDLC SERPL CALC-MCNC: 104 MG/DL (ref 0–100)
LDLC/HDLC SERPL: 2.3 {RATIO}
LYMPHOCYTES # BLD AUTO: 2.43 10*3/MM3 (ref 0.7–3.1)
LYMPHOCYTES NFR BLD AUTO: 32.7 % (ref 19.6–45.3)
MCH RBC QN AUTO: 32.6 PG (ref 26.6–33)
MCHC RBC AUTO-ENTMCNC: 35.2 G/DL (ref 31.5–35.7)
MCV RBC AUTO: 92.5 FL (ref 79–97)
MONOCYTES # BLD AUTO: 0.68 10*3/MM3 (ref 0.1–0.9)
MONOCYTES NFR BLD AUTO: 9.1 % (ref 5–12)
NEUTROPHILS NFR BLD AUTO: 4.06 10*3/MM3 (ref 1.7–7)
NEUTROPHILS NFR BLD AUTO: 54.6 % (ref 42.7–76)
NRBC BLD AUTO-RTO: 0.1 /100 WBC (ref 0–0.2)
PLATELET # BLD AUTO: 216 10*3/MM3 (ref 140–450)
PMV BLD AUTO: 11 FL (ref 6–12)
POTASSIUM SERPL-SCNC: 3.8 MMOL/L (ref 3.5–5.2)
PROT SERPL-MCNC: 7.6 G/DL (ref 6–8.5)
RBC # BLD AUTO: 4.42 10*6/MM3 (ref 4.14–5.8)
SODIUM SERPL-SCNC: 140 MMOL/L (ref 136–145)
TRIGL SERPL-MCNC: 261 MG/DL (ref 0–150)
VLDLC SERPL-MCNC: 45 MG/DL (ref 5–40)
WBC NRBC COR # BLD: 7.44 10*3/MM3 (ref 3.4–10.8)

## 2023-03-06 NOTE — PROGRESS NOTES
I have spoken with the patient .  I have discussed the patient with Dr. Haque.  I have also reviewed the notes, assessments, and/or procedures performed by Dr. Ravindra Haque,   I concur with   his  documentation and assessment and plan for Julia Ananda AlvaradoGay.          This document has been electronically signed by Ronnie Ureña MD on March 6, 2023 14:58 CST

## 2023-03-29 ENCOUNTER — TELEPHONE (OUTPATIENT)
Dept: FAMILY MEDICINE CLINIC | Facility: CLINIC | Age: 53
End: 2023-03-29
Payer: MEDICARE

## 2023-03-29 RX ORDER — FAMOTIDINE 20 MG
25 TABLET ORAL DAILY
Qty: 90 CAPSULE | Refills: 0 | Status: SHIPPED | OUTPATIENT
Start: 2023-03-29 | End: 2023-03-29

## 2023-03-29 RX ORDER — FAMOTIDINE 20 MG
25 TABLET ORAL DAILY
Qty: 90 CAPSULE | Refills: 0 | Status: SHIPPED | OUTPATIENT
Start: 2023-03-29

## 2023-03-29 NOTE — TELEPHONE ENCOUNTER
Pt called needing vitamin D and would like it sent to [facility].     Call back# [phone]

## 2023-04-21 DIAGNOSIS — Z12.11 SCREENING FOR COLORECTAL CANCER: Primary | ICD-10-CM

## 2023-04-21 DIAGNOSIS — Z12.12 SCREENING FOR COLORECTAL CANCER: Primary | ICD-10-CM

## 2023-07-31 DIAGNOSIS — M54.50 CHRONIC BILATERAL LOW BACK PAIN WITHOUT SCIATICA: ICD-10-CM

## 2023-07-31 DIAGNOSIS — G89.29 CHRONIC BILATERAL LOW BACK PAIN WITHOUT SCIATICA: ICD-10-CM

## 2023-09-12 ENCOUNTER — TELEPHONE (OUTPATIENT)
Dept: FAMILY MEDICINE CLINIC | Facility: CLINIC | Age: 53
End: 2023-09-12
Payer: MEDICARE

## 2023-09-12 NOTE — TELEPHONE ENCOUNTER
Pt's wife called and asked if Dr. Haque could send something in for a migraine. Also, stated pt has pink eye and would like something for that as well, call both into [facility]. Call back at [phone].    Thank you!

## 2023-10-29 RX ORDER — MELOXICAM 15 MG/1
TABLET ORAL
Qty: 30 TABLET | Refills: 1 | OUTPATIENT
Start: 2023-10-29

## (undated) DEVICE — GOWN,AURORA,NOREINF,RAGLAN,XL,STERILE: Brand: MEDLINE

## (undated) DEVICE — GLV SURG TRIUMPH LT PF LTX 7.5 STRL

## (undated) DEVICE — TRY IRR

## (undated) DEVICE — SUT NLY 2/0 664G

## (undated) DEVICE — TP SXN YANKR BLB TIP W/TBG 10F LF STRL

## (undated) DEVICE — TOWEL,OR,DSP,ST,BLUE,DLX,8/PK,10PK/CS: Brand: MEDLINE

## (undated) DEVICE — SPLNT NASL AIRWY SIL STRL

## (undated) DEVICE — STERILE POLYISOPRENE POWDER-FREE SURGICAL GLOVES: Brand: PROTEXIS

## (undated) DEVICE — SYR 10ML

## (undated) DEVICE — GLV SURG SENSICARE GREEN W/ALOE PF LF 8 STRL

## (undated) DEVICE — COAGULATOR SXN HNDSWITCH 10F16IN

## (undated) DEVICE — GLV SURG TRIUMPH LT PF LTX 6.5 STRL

## (undated) DEVICE — KT ANTI FOG W/FLD AND SPNG

## (undated) DEVICE — DEFOGGER!" ANTI FOG KIT: Brand: DEROYAL

## (undated) DEVICE — STERILE POLYISOPRENE POWDER-FREE SURGICAL GLOVES WITH EMOLLIENT COATING: Brand: PROTEXIS

## (undated) DEVICE — CONTAINER,SPECIMEN,OR STERILE,4OZ: Brand: MEDLINE

## (undated) DEVICE — SOL IRRIG NACL 1000ML

## (undated) DEVICE — Device

## (undated) DEVICE — SUT PLAIN 1 4/0 1828H

## (undated) DEVICE — PAD GRND REM POLYHESIVE A/ DISP

## (undated) DEVICE — PK ENT LF 60

## (undated) DEVICE — GLV SURG SENSICARE POLYISPRN W/ALOE PF LF 6.5 GRN STRL

## (undated) DEVICE — GLV SURG SENSICARE PI PF LF 7 GRN STRL

## (undated) DEVICE — TBG DECLOG DIEGO ELITE MULTIDEBRIDER ST

## (undated) DEVICE — BLD BIPOL DIEGO SMR STR STD TYPE A 2MM

## (undated) DEVICE — NDL SPINE 25G 4 11/16 BLU

## (undated) DEVICE — NDL HYPO ECLPS SFTY 25G 1 1/2IN

## (undated) DEVICE — CODMAN® SURGICAL PATTIES 1/2" X 3" (1.27CM X 7.62CM): Brand: CODMAN®

## (undated) DEVICE — CULT  ANAEROBIC